# Patient Record
Sex: FEMALE | Race: NATIVE HAWAIIAN OR OTHER PACIFIC ISLANDER | Employment: FULL TIME | URBAN - METROPOLITAN AREA
[De-identification: names, ages, dates, MRNs, and addresses within clinical notes are randomized per-mention and may not be internally consistent; named-entity substitution may affect disease eponyms.]

---

## 2017-01-23 ENCOUNTER — OFFICE VISIT (OUTPATIENT)
Dept: FAMILY MEDICINE CLINIC | Age: 37
End: 2017-01-23

## 2017-01-23 VITALS
HEART RATE: 76 BPM | BODY MASS INDEX: 31.36 KG/M2 | WEIGHT: 224 LBS | DIASTOLIC BLOOD PRESSURE: 75 MMHG | RESPIRATION RATE: 20 BRPM | OXYGEN SATURATION: 99 % | HEIGHT: 71 IN | SYSTOLIC BLOOD PRESSURE: 121 MMHG | TEMPERATURE: 97.1 F

## 2017-01-23 DIAGNOSIS — G89.29 CHRONIC LEFT SHOULDER PAIN: ICD-10-CM

## 2017-01-23 DIAGNOSIS — M25.512 CHRONIC LEFT SHOULDER PAIN: ICD-10-CM

## 2017-01-23 DIAGNOSIS — R13.10 DYSPHAGIA, UNSPECIFIED TYPE: ICD-10-CM

## 2017-01-23 DIAGNOSIS — M79.89 LEG SWELLING: ICD-10-CM

## 2017-01-23 DIAGNOSIS — I10 ESSENTIAL HYPERTENSION: Primary | ICD-10-CM

## 2017-01-23 RX ORDER — CHLORTHALIDONE 25 MG/1
25 TABLET ORAL DAILY
Qty: 30 TAB | Refills: 5 | Status: SHIPPED | OUTPATIENT
Start: 2017-01-23 | End: 2017-09-25 | Stop reason: SDUPTHER

## 2017-01-23 RX ORDER — NAPROXEN 500 MG/1
500 TABLET ORAL 2 TIMES DAILY WITH MEALS
Qty: 60 TAB | Refills: 1 | Status: SHIPPED | OUTPATIENT
Start: 2017-01-23 | End: 2017-12-08

## 2017-01-23 NOTE — PROGRESS NOTES
Brooke Myles 39 y.o. female   Chief Complaint   Patient presents with    Leg Pain     x 2 months without injury    Leg Swelling         1. Have you been to the ER, urgent care clinic since your last visit? Hospitalized since your last visit? No    2. Have you seen or consulted any other health care providers outside of the [facility] since your last visit? Include any pap smears or colon screening.  No

## 2017-01-23 NOTE — PROGRESS NOTES
HISTORY OF PRESENT ILLNESS  Luis M Benavidez is a 39 y.o. female. Chief Complaint   Patient presents with    Leg Pain     x 2 months without injury    Leg Swelling       HPI  Pt c/o b lower leg pain and swelling. The swelling in the R foot is more frequent than the left. The leg pain is present upon awakening. The swelling is not usually there first thing in the morning. The swelling gets worse over the course of the day and the pain increases as well. The swelling is not more pronounced on days that she is more active. Pt is taking her hctz regularly and her bp has been nl. Pt also c/o L shoulder pain that has been present for several months. The pain is present with movement of the shoulder. She is concerned about the possibility of bone cancer due to a family member having had it with a similar presentation in the past.      Pt c/o occasional chest pain. She feels that the pill gets caught in her throat when she takes her bp med. Sometimes she has tightness in her chest.    Review of Systems   Constitutional: Negative. HENT: Negative. Respiratory: Negative. Cardiovascular: Positive for leg swelling. Negative for palpitations. Gastrointestinal:        Difficulty swallowing small pills   Musculoskeletal: Positive for joint pain. All other systems reviewed and are negative. Physical Exam   Constitutional: She is oriented to person, place, and time. She appears well-developed and well-nourished. HENT:   Head: Normocephalic and atraumatic. Right Ear: External ear normal.   Left Ear: External ear normal.   Nose: Nose normal.   Eyes: Conjunctivae and EOM are normal.   Neck: Normal range of motion. Neck supple. No JVD present. Carotid bruit is not present. No thyromegaly present. Cardiovascular: Normal rate, regular rhythm, normal heart sounds and intact distal pulses. Exam reveals no gallop and no friction rub. No murmur heard.   Pulmonary/Chest: Effort normal and breath sounds normal. She has no wheezes. She has no rhonchi. She has no rales. Musculoskeletal: Normal range of motion. Left shoulder: She exhibits tenderness. She exhibits normal range of motion and no deformity. Right lower leg: Normal. She exhibits no swelling and no edema. Left lower leg: Normal. She exhibits no swelling and no edema. Right foot: There is tenderness and swelling. Left foot: Normal. There is no swelling. Feet:    L shoulder +ttp near [facility], operated by [facility] joint; no post/inf instab; + Neer's; pain with int/ext rotation. Lymphadenopathy:     She has no cervical adenopathy. Neurological: She is alert and oriented to person, place, and time. Coordination normal.   Skin: Skin is warm and dry. Psychiatric: She has a normal mood and affect. Her behavior is normal. Judgment and thought content normal.   Nursing note and vitals reviewed. ASSESSMENT and PLAN  Roberto Pires was seen today for leg pain and leg swelling. Diagnoses and all orders for this visit:    Essential hypertension  -     chlorthalidone (HYGROTEN) 25 mg tablet; Take 1 Tab by mouth daily. D/c hctz. Chronic left shoulder pain  -     naproxen (NAPROSYN) 500 mg tablet; Take 1 Tab by mouth two (2) times daily (with meals). -     XR SHOULDER LT AP/LAT MIN 2 V; Future  Low suspicion for malignancy. Suspect AC joint pathology or muscle/tendon injury. Leg swelling  Discussed use of compression hose. Trial chlorthalidone. If no improvement, consider vascular eval.  Discussed possible role of wt in chronic leg swelling. Difficulty swallowing;  Pt advised to try eating something after taking her pill to help force it down. No other swallowing problems at this time.       Follow-up Disposition:  2-4 wks

## 2017-02-01 ENCOUNTER — HOSPITAL ENCOUNTER (OUTPATIENT)
Dept: GENERAL RADIOLOGY | Age: 37
Discharge: HOME | End: 2017-02-01
Payer: COMMERCIAL

## 2017-02-01 DIAGNOSIS — G89.29 CHRONIC LEFT SHOULDER PAIN: ICD-10-CM

## 2017-02-01 DIAGNOSIS — M25.512 CHRONIC LEFT SHOULDER PAIN: ICD-10-CM

## 2017-02-01 PROCEDURE — 73030 X-RAY EXAM OF SHOULDER: CPT

## 2017-02-06 ENCOUNTER — TELEPHONE (OUTPATIENT)
Dept: FAMILY MEDICINE CLINIC | Age: 37
End: 2017-02-06

## 2017-05-09 ENCOUNTER — TELEPHONE (OUTPATIENT)
Dept: FAMILY MEDICINE CLINIC | Age: 37
End: 2017-05-09

## 2017-09-25 DIAGNOSIS — I10 ESSENTIAL HYPERTENSION: ICD-10-CM

## 2017-09-25 RX ORDER — CHLORTHALIDONE 25 MG/1
TABLET ORAL
Qty: 30 TAB | Refills: 5 | Status: SHIPPED | OUTPATIENT
Start: 2017-09-25 | End: 2018-04-26 | Stop reason: SDUPTHER

## 2017-10-27 ENCOUNTER — OFFICE VISIT (OUTPATIENT)
Dept: FAMILY MEDICINE CLINIC | Age: 37
End: 2017-10-27

## 2017-10-27 ENCOUNTER — HOSPITAL ENCOUNTER (OUTPATIENT)
Dept: GENERAL RADIOLOGY | Age: 37
Discharge: HOME | End: 2017-10-27
Payer: COMMERCIAL

## 2017-10-27 ENCOUNTER — HOSPITAL ENCOUNTER (OUTPATIENT)
Dept: LAB | Age: 37
Discharge: HOME | End: 2017-10-27
Payer: COMMERCIAL

## 2017-10-27 VITALS
HEART RATE: 90 BPM | OXYGEN SATURATION: 97 % | HEIGHT: 71 IN | DIASTOLIC BLOOD PRESSURE: 60 MMHG | BODY MASS INDEX: 30.1 KG/M2 | WEIGHT: 215 LBS | TEMPERATURE: 98.5 F | SYSTOLIC BLOOD PRESSURE: 125 MMHG

## 2017-10-27 DIAGNOSIS — N89.8 VAGINAL DISCHARGE: ICD-10-CM

## 2017-10-27 DIAGNOSIS — N89.8 VAGINAL DISCHARGE: Primary | ICD-10-CM

## 2017-10-27 DIAGNOSIS — R07.81 PLEURITIC PAIN: ICD-10-CM

## 2017-10-27 PROCEDURE — 87798 DETECT AGENT NOS DNA AMP: CPT | Performed by: NURSE PRACTITIONER

## 2017-10-27 PROCEDURE — 71020 XR CHEST PA LAT: CPT

## 2017-10-27 NOTE — MR AVS SNAPSHOT
Visit Information Date & Time Provider Department Dept. Phone Encounter #  
 10/27/2017  1:15 PM Nick Pisano NP [address] [id number] Follow-up Instructions Return in about 1 week (around 11/3/2017), or if symptoms worsen or fail to improve, for physical exam with PCP. Upcoming Health Maintenance Date Due Pneumococcal 19-64 Medium Risk (1 of 1 - PPSV23) 3/23/1999 DTaP/Tdap/Td series (1 - Tdap) 3/23/2001 PAP AKA CERVICAL CYTOLOGY 3/31/2015 BREAST CANCER SCRN MAMMOGRAM 8/20/2015 INFLUENZA AGE 9 TO ADULT 8/1/2017 Allergies as of 10/27/2017  Review Complete On: 10/27/2017 By: Raj Espana LPN No Known Allergies Current Immunizations  Never Reviewed No immunizations on file. Not reviewed this visit You Were Diagnosed With   
  
 Codes Comments Vaginal discharge    -  Primary ICD-10-CM: N89.8 ICD-9-CM: 623.5 Pleuritic pain     ICD-10-CM: R07.81 ICD-9-CM: 786.52 Vitals BP Pulse Temp Height(growth percentile) Weight(growth percentile) SpO2  
 125/60 (BP 1 Location: Left arm, BP Patient Position: Sitting) 90 98.5 °F (36.9 °C) (Oral) 5' 11\" (1.803 m) 215 lb (97.5 kg) 97% BMI OB Status Smoking Status 29.99 kg/m2 IUD Current Every Day Smoker BMI and BSA Data Body Mass Index Body Surface Area  
 29.99 kg/m 2 2.21 m 2 Preferred Pharmacy Pharmacy Name Phone [facility] [address], [address] [facility] Your Updated Medication List  
  
   
This list is accurate as of: 10/27/17  2:18 PM.  Always use your most recent med list.  
  
  
  
  
 chlorthalidone 25 mg tablet Commonly known as:  HYGROTEN  
TAKE ONE TABLET BY MOUTH ONCE DAILY  
  
 eletriptan 40 mg tablet Commonly known as:  RELPAX Take 1 Tab by mouth once as needed for 1 dose.  may repeat in 2 hours if necessary  
  
 hydrocortisone 2.5 % rectal cream  
 Commonly known as:  PROCTOCREAM-HC Insert  into rectum four (4) times daily. multivitamin tablet Commonly known as:  ONE A DAY Take 1 Tab by mouth daily. naproxen 500 mg tablet Commonly known as:  NAPROSYN Take 1 Tab by mouth two (2) times daily (with meals). traMADol 50 mg tablet Commonly known as:  ULTRAM  
Take 1 Tab by mouth every six (6) hours as needed for Pain. Max Daily Amount: 200 mg. Follow-up Instructions Return in about 1 week (around 11/3/2017), or if symptoms worsen or fail to improve, for physical exam with PCP. Introducing [facility]! Dear Chasidy Cifuentes: Thank you for requesting a [employer] account. Our records indicate that you already have an active [employer] account. You can access your account anytime at [email or web address] Did you know that you can access your hospital and ER discharge instructions at any time in [employer]? You can also review all of your test results from your hospital stay or ER visit. Additional Information If you have questions, please visit the Frequently Asked Questions section of the [employer] website at [email or web address] Remember, B[employer] is NOT to be used for urgent needs. For medical emergencies, dial 911. Now available from your iPhone and Android! Please provide this summary of care documentation to your next provider. Your primary care clinician is listed as Ester Andrews. If you have any questions after today's visit, please call [phone].

## 2017-10-27 NOTE — PROGRESS NOTES
HPI  Juliet Simon is a 40 y.o. female  Chief Complaint   Patient presents with    Vaginal Discharge     Pt states that she noticed vaginal discharge and odor for 2 weeks. Pt states that the discharge is white and tan. Pt denies any vaginal itching. Vaginal odor and vaginal discharge for 2 weeks. Reports last menses   Reports one partner and denies that partner has any concerns. Denies pain on urination, frequency, urgency, or blood in urine. Denies fevers or chills. Denies abdominal or flank pain but admits to a pain when she takes a deep breath on right side for a week. Reports naproxen does not help. 6/10. Denies injury, trauma, or falls. Denies chest pain. Denies shortness of breath. Denies depression - reports she just does not like coming to the doctors. Past Medical History  Past Medical History:   Diagnosis Date    Chondromalacia of left patella     Epicondylitis     right elbow, lateral    Epicondylitis, lateral 11/2013    right elbow    Essential hypertension 10/24/2013    Headache, migraine     HTN (hypertension)     Knee pain     Right ankle pain     Wears glasses        Surgical History  Past Surgical History:   Procedure Laterality Date    HX BREAST REDUCTION      04/2012    HX GYN      vaginal delivery x 1    HX OTHER SURGICAL      oral surgery, unspecified         Medications  Current Outpatient Prescriptions   Medication Sig Dispense Refill    chlorthalidone (HYGROTEN) 25 mg tablet TAKE ONE TABLET BY MOUTH ONCE DAILY 30 Tab 5    eletriptan (RELPAX) 40 mg tablet Take 1 Tab by mouth once as needed for 1 dose. may repeat in 2 hours if necessary 18 Tab 5    naproxen (NAPROSYN) 500 mg tablet Take 1 Tab by mouth two (2) times daily (with meals). 60 Tab 1    hydrocortisone (PROCTOCREAM-HC) 2.5 % rectal cream Insert  into rectum four (4) times daily. 30 g 0    traMADol (ULTRAM) 50 mg tablet Take 1 Tab by mouth every six (6) hours as needed for Pain.  Max Daily Amount: 200 mg. 20 Tab 0    multivitamin (ONE A DAY) tablet Take 1 Tab by mouth daily. Allergies  No Known Allergies    Family History  Family History   Problem Relation Age of Onset    Cancer Other     Hypertension Other     Diabetes Other        Social History  Social History     Social History    Marital status: LEGALLY      Spouse name: N/A    Number of children: N/A    Years of education: N/A     Occupational History    Not on file. Social History Main Topics    Smoking status: Current Every Day Smoker     Packs/day: 0.25     Types: Cigarettes    Smokeless tobacco: Never Used    Alcohol use 2.5 oz/week     5 Standard drinks or equivalent per week      Comment: occassional    Drug use: No    Sexual activity: Yes     Partners: Male     Other Topics Concern    Not on file     Social History Narrative       Problem List  Patient Active Problem List   Diagnosis Code    Family history of hypertension Z82.49    Family history of diabetes mellitus (DM) Z83.3    Essential hypertension I10    Thrombocytopenia ([facility] [address] D69.6    IUD contraception Z97.5       Review of Systems  Review of Systems   Constitutional: Negative for chills and fever. Respiratory: Negative for shortness of breath. Cardiovascular: Negative for chest pain and palpitations. Gastrointestinal: Negative for abdominal pain, nausea and vomiting. Genitourinary: Negative for dysuria, flank pain, frequency, hematuria and urgency. Musculoskeletal: Negative for falls.        Vital Signs  Vitals:    10/27/17 1340   BP: 125/60   Pulse: 90   Temp: 98.5 °F (36.9 °C)   TempSrc: Oral   SpO2: 97%   Weight: 215 lb (97.5 kg)   Height: 5' 11\" (1.803 m)   PainSc:   0 - No pain     PHQ over the last two weeks 10/27/2017   Little interest or pleasure in doing things Not at all   Feeling down, depressed or hopeless Not at all   Total Score PHQ 2 0     Physical Exam  Physical Exam   Constitutional: She is oriented to person, place, and time. Cardiovascular: Normal rate and regular rhythm. No murmur heard. Pulmonary/Chest: Effort normal and breath sounds normal. No respiratory distress. She exhibits tenderness. Right rib area on exhaling. Genitourinary: There is no rash or tenderness on the right labia. There is no rash or tenderness on the left labia. Cervix exhibits discharge. Cervix exhibits no motion tenderness and no friability. Vaginal discharge found. Genitourinary Comments: Thin white discharge from cervix and vagina   Neurological: She is alert and oriented to person, place, and time. Skin: Skin is warm and dry. Psychiatric: She exhibits a depressed mood. Appears sad -  Behavior - tearful       Diagnostics  Orders Placed This Encounter    XR CHEST PA LAT     Standing Status:   Future     Number of Occurrences:   1     Standing Expiration Date:   11/27/2018     Order Specific Question:   Reason for Exam     Answer:   pleuritic     Order Specific Question:   Is Patient Allergic to Contrast Dye? Answer:   Unknown     Order Specific Question:   Is Patient Pregnant?      Answer:   Unknown    NUSWAB VAGINITIS PLUS     Standing Status:   Future     Number of Occurrences:   1     Standing Expiration Date:   10/28/2018       Results  Results for orders placed or performed during the hospital encounter of 10/25/15   URINALYSIS W/ RFLX MICROSCOPIC   Result Value Ref Range    Color YELLOW      Appearance CLEAR      Specific gravity 1.015 1.003 - 1.030      pH (UA) 7.0 5.0 - 8.0      Protein NEGATIVE  NEG mg/dL    Glucose NEGATIVE  NEG mg/dL    Ketone NEGATIVE  NEG mg/dL    Bilirubin NEGATIVE  NEG      Blood SMALL (A) NEG      Urobilinogen 0.2 0.2 - 1.0 EU/dL    Nitrites NEGATIVE  NEG      Leukocyte Esterase NEGATIVE  NEG     HCG URINE, QL   Result Value Ref Range    HCG urine, Ql. NEGATIVE  NEG     CBC WITH AUTOMATED DIFF   Result Value Ref Range    WBC 5.8 4.6 - 13.2 K/uL    RBC 5.27 4.20 - 5.30 M/uL    HGB 14.7 12.0 - 16.0 g/dL    HCT 45.4 (H) 35.0 - 45.0 %    MCV 86.1 74.0 - 97.0 FL    MCH 27.9 24.0 - 34.0 PG    MCHC 32.4 31.0 - 37.0 g/dL    RDW 12.9 11.6 - 14.5 %    PLATELET 248 (L) 017 - 420 K/uL    MPV 10.1 9.2 - 11.8 FL    NEUTROPHILS 48 40 - 73 %    LYMPHOCYTES 39 21 - 52 %    MONOCYTES 9 3 - 10 %    EOSINOPHILS 1 0 - 5 %    BASOPHILS 3 (H) 0 - 2 %    ABS. NEUTROPHILS 2.8 1.8 - 8.0 K/UL    ABS. LYMPHOCYTES 2.3 0.9 - 3.6 K/UL    ABS. MONOCYTES 0.5 0.05 - 1.2 K/UL    ABS. EOSINOPHILS 0.1 0.0 - 0.4 K/UL    ABS. BASOPHILS 0.2 (H) 0.0 - 0.06 K/UL    DF AUTOMATED     METABOLIC PANEL, BASIC   Result Value Ref Range    Sodium 141 136 - 145 mmol/L    Potassium 3.8 3.5 - 5.5 mmol/L    Chloride 105 100 - 108 mmol/L    CO2 30 21 - 32 mmol/L    Anion gap 6 3.0 - 18 mmol/L    Glucose 96 74 - 99 mg/dL    BUN 8 7.0 - 18 MG/DL    Creatinine 0.90 0.6 - 1.3 MG/DL    BUN/Creatinine ratio 9 (L) 12 - 20      GFR est AA >60 >60 ml/min/1.73m2    GFR est non-AA >60 >60 ml/min/1.73m2    Calcium 9.1 8.5 - 10.1 MG/DL   URINE MICROSCOPIC ONLY   Result Value Ref Range    WBC 0 to 3 0 - 4 /hpf    RBC 0 to 3 0 - 5 /hpf    Epithelial cells FEW 0 - 5 /lpf         Assessment and Plan  Diagnoses and all orders for this visit:    1. Vaginal discharge  -     NUSWAB VAGINITIS PLUS; Future    2. Pleuritic pain  -     XR CHEST PA LAT; Future        After care summary printed and reviewed with patient. Plan reviewed with patient. Questions answered. Patient verbalized understanding of plan and is in agreement with plan. Patient to follow up in one week with PCP for full follow up and physical or earlier if symptoms worsen.      ROSARIO Naqvi

## 2017-10-27 NOTE — PROGRESS NOTES
1. Have you been to the ER, urgent care clinic since your last visit? Hospitalized since your last visit? No    2. Have you seen or consulted any other health care providers outside of the [address] [place] [address] since your last visit? Include any pap smears or colon screening. No    Is someone accompanying this pt? no    Is the patient using any DME equipment during OV? no      Chief Complaint   Patient presents with    Vaginal Discharge     Pt states that she noticed vaginal discharge and odor for 2 weeks. Pt states that the discharge is white and tan. Pt denies any vaginal itching.

## 2017-10-31 ENCOUNTER — TELEPHONE (OUTPATIENT)
Dept: FAMILY MEDICINE CLINIC | Age: 37
End: 2017-10-31

## 2017-10-31 DIAGNOSIS — N76.0 BV (BACTERIAL VAGINOSIS): Primary | ICD-10-CM

## 2017-10-31 DIAGNOSIS — B96.89 BV (BACTERIAL VAGINOSIS): Primary | ICD-10-CM

## 2017-10-31 LAB
A VAGINAE DNA VAG QL NAA+PROBE: NORMAL SCORE
BVAB2 DNA VAG QL NAA+PROBE: NORMAL SCORE
C ALBICANS DNA VAG QL NAA+PROBE: NEGATIVE
C GLABRATA DNA VAG QL NAA+PROBE: NEGATIVE
C TRACH RRNA SPEC QL NAA+PROBE: NEGATIVE
MEGA1 DNA VAG QL NAA+PROBE: NORMAL SCORE
N GONORRHOEA RRNA SPEC QL NAA+PROBE: NEGATIVE
SPECIMEN SOURCE: NORMAL
T VAGINALIS RRNA SPEC QL NAA+PROBE: NEGATIVE

## 2017-10-31 RX ORDER — METRONIDAZOLE 500 MG/1
500 TABLET ORAL 2 TIMES DAILY
Qty: 14 TAB | Refills: 0 | Status: SHIPPED | OUTPATIENT
Start: 2017-10-31 | End: 2017-11-07

## 2017-10-31 NOTE — TELEPHONE ENCOUNTER
Call to patient to discuss Nu Swab and Xray results. No answer. Unable to leave a message as mailbox is full.  Natan Carrasco

## 2017-11-06 ENCOUNTER — OFFICE VISIT (OUTPATIENT)
Dept: FAMILY MEDICINE CLINIC | Age: 37
End: 2017-11-06

## 2017-11-06 ENCOUNTER — HOSPITAL ENCOUNTER (OUTPATIENT)
Dept: LAB | Age: 37
Discharge: HOME | End: 2017-11-06
Payer: COMMERCIAL

## 2017-11-06 VITALS
BODY MASS INDEX: 29.96 KG/M2 | HEIGHT: 71 IN | DIASTOLIC BLOOD PRESSURE: 66 MMHG | WEIGHT: 214 LBS | SYSTOLIC BLOOD PRESSURE: 123 MMHG | TEMPERATURE: 98.5 F | HEART RATE: 80 BPM | OXYGEN SATURATION: 100 %

## 2017-11-06 DIAGNOSIS — N76.0 BV (BACTERIAL VAGINOSIS): ICD-10-CM

## 2017-11-06 DIAGNOSIS — B96.89 BV (BACTERIAL VAGINOSIS): ICD-10-CM

## 2017-11-06 DIAGNOSIS — Z01.419 WELL WOMAN EXAM WITH ROUTINE GYNECOLOGICAL EXAM: Primary | ICD-10-CM

## 2017-11-06 DIAGNOSIS — S01.331A COMPLICATION OF RIGHT EAR PIERCING, INITIAL ENCOUNTER: ICD-10-CM

## 2017-11-06 DIAGNOSIS — R05.9 COUGH: ICD-10-CM

## 2017-11-06 DIAGNOSIS — K64.4 EXTERNAL HEMORRHOID: ICD-10-CM

## 2017-11-06 DIAGNOSIS — Z71.2 ENCOUNTER TO DISCUSS TEST RESULTS: ICD-10-CM

## 2017-11-06 DIAGNOSIS — Z01.419 WELL WOMAN EXAM WITH ROUTINE GYNECOLOGICAL EXAM: ICD-10-CM

## 2017-11-06 LAB
ALBUMIN SERPL-MCNC: 4.1 G/DL (ref 3.4–5)
ALBUMIN/GLOB SERPL: 1.2 {RATIO} (ref 0.8–1.7)
ALP SERPL-CCNC: 50 U/L (ref 45–117)
ALT SERPL-CCNC: 20 U/L (ref 13–56)
ANION GAP SERPL CALC-SCNC: 10 MMOL/L (ref 3–18)
AST SERPL-CCNC: 13 U/L (ref 15–37)
BASOPHILS # BLD: 0 K/UL (ref 0–0.06)
BASOPHILS NFR BLD: 0 % (ref 0–2)
BILIRUB SERPL-MCNC: 0.9 MG/DL (ref 0.2–1)
BUN SERPL-MCNC: 10 MG/DL (ref 7–18)
BUN/CREAT SERPL: 12 (ref 12–20)
CALCIUM SERPL-MCNC: 9.4 MG/DL (ref 8.5–10.1)
CHLORIDE SERPL-SCNC: 101 MMOL/L (ref 100–108)
CHOLEST SERPL-MCNC: 168 MG/DL
CO2 SERPL-SCNC: 29 MMOL/L (ref 21–32)
CREAT SERPL-MCNC: 0.83 MG/DL (ref 0.6–1.3)
DIFFERENTIAL METHOD BLD: ABNORMAL
EOSINOPHIL # BLD: 0.1 K/UL (ref 0–0.4)
EOSINOPHIL NFR BLD: 2 % (ref 0–5)
ERYTHROCYTE [DISTWIDTH] IN BLOOD BY AUTOMATED COUNT: 13 % (ref 11.6–14.5)
EST. AVERAGE GLUCOSE BLD GHB EST-MCNC: 105 MG/DL
GLOBULIN SER CALC-MCNC: 3.4 G/DL (ref 2–4)
GLUCOSE SERPL-MCNC: 85 MG/DL (ref 74–99)
HBA1C MFR BLD: 5.3 % (ref 4.2–5.6)
HCT VFR BLD AUTO: 46 % (ref 35–45)
HDLC SERPL-MCNC: 53 MG/DL (ref 40–60)
HDLC SERPL: 3.2 {RATIO} (ref 0–5)
HGB BLD-MCNC: 15.1 G/DL (ref 12–16)
LDLC SERPL CALC-MCNC: 101.4 MG/DL (ref 0–100)
LIPID PROFILE,FLP: ABNORMAL
LYMPHOCYTES # BLD: 1.5 K/UL (ref 0.9–3.6)
LYMPHOCYTES NFR BLD: 23 % (ref 21–52)
MCH RBC QN AUTO: 29.5 PG (ref 24–34)
MCHC RBC AUTO-ENTMCNC: 32.8 G/DL (ref 31–37)
MCV RBC AUTO: 90 FL (ref 74–97)
MONOCYTES # BLD: 0.5 K/UL (ref 0.05–1.2)
MONOCYTES NFR BLD: 8 % (ref 3–10)
NEUTS SEG # BLD: 4.4 K/UL (ref 1.8–8)
NEUTS SEG NFR BLD: 67 % (ref 40–73)
PLATELET # BLD AUTO: 163 K/UL (ref 135–420)
PMV BLD AUTO: 10.2 FL (ref 9.2–11.8)
POTASSIUM SERPL-SCNC: 4 MMOL/L (ref 3.5–5.5)
PROT SERPL-MCNC: 7.5 G/DL (ref 6.4–8.2)
RBC # BLD AUTO: 5.11 M/UL (ref 4.2–5.3)
SODIUM SERPL-SCNC: 140 MMOL/L (ref 136–145)
T4 FREE SERPL-MCNC: 1 NG/DL (ref 0.7–1.5)
TRIGL SERPL-MCNC: 68 MG/DL (ref ?–150)
TSH SERPL DL<=0.05 MIU/L-ACNC: 0.88 UIU/ML (ref 0.36–3.74)
VLDLC SERPL CALC-MCNC: 13.6 MG/DL
WBC # BLD AUTO: 6.5 K/UL (ref 4.6–13.2)

## 2017-11-06 PROCEDURE — 80053 COMPREHEN METABOLIC PANEL: CPT | Performed by: NURSE PRACTITIONER

## 2017-11-06 PROCEDURE — 83036 HEMOGLOBIN GLYCOSYLATED A1C: CPT | Performed by: NURSE PRACTITIONER

## 2017-11-06 PROCEDURE — 36415 COLL VENOUS BLD VENIPUNCTURE: CPT | Performed by: NURSE PRACTITIONER

## 2017-11-06 PROCEDURE — 84443 ASSAY THYROID STIM HORMONE: CPT | Performed by: NURSE PRACTITIONER

## 2017-11-06 PROCEDURE — 80061 LIPID PANEL: CPT | Performed by: NURSE PRACTITIONER

## 2017-11-06 PROCEDURE — 84439 ASSAY OF FREE THYROXINE: CPT | Performed by: NURSE PRACTITIONER

## 2017-11-06 PROCEDURE — 82306 VITAMIN D 25 HYDROXY: CPT | Performed by: NURSE PRACTITIONER

## 2017-11-06 PROCEDURE — 85025 COMPLETE CBC W/AUTO DIFF WBC: CPT | Performed by: NURSE PRACTITIONER

## 2017-11-06 RX ORDER — BENZONATATE 200 MG/1
200 CAPSULE ORAL
Qty: 30 CAP | Refills: 0 | Status: SHIPPED | OUTPATIENT
Start: 2017-11-06 | End: 2017-11-13

## 2017-11-06 NOTE — PROGRESS NOTES
Chief Complaint   Patient presents with    Well Woman     with pap     Health Maintenance Due   Topic Date Due    Pneumococcal 19-64 Medium Risk (1 of 1 - PPSV23) 03/23/1999    DTaP/Tdap/Td series (1 - Tdap) 03/23/2001    PAP AKA CERVICAL CYTOLOGY  03/31/2015    BREAST CANCER SCRN MAMMOGRAM  08/20/2015    INFLUENZA AGE 9 TO ADULT  08/01/2017

## 2017-11-06 NOTE — MR AVS SNAPSHOT
Visit Information Date & Time Provider Department Dept. Phone Encounter #  
 11/6/2017  9:00 AM Yudelka Dubose NP [address] [id number] Follow-up Instructions Return if symptoms worsen or fail to improve, for follow up with PCP. Upcoming Health Maintenance Date Due Pneumococcal 19-64 Medium Risk (1 of 1 - PPSV23) 3/23/1999 DTaP/Tdap/Td series (1 - Tdap) 3/23/2001 PAP AKA CERVICAL CYTOLOGY 3/31/2015 BREAST CANCER SCRN MAMMOGRAM 8/20/2015 Allergies as of 11/6/2017  Review Complete On: 11/6/2017 By: Kay Del Rio LPN No Known Allergies Current Immunizations  Never Reviewed No immunizations on file. Not reviewed this visit You Were Diagnosed With   
  
 Codes Comments Well woman exam with routine gynecological exam    -  Primary ICD-10-CM: T12.186 ICD-9-CM: V72.31 Encounter to discuss test results     ICD-10-CM: Z71.89 ICD-9-CM: V65.49   
 BV (bacterial vaginosis)     ICD-10-CM: N76.0, B96.89 
ICD-9-CM: 616.10, 041.9 Cough     ICD-10-CM: R05 ICD-9-CM: 248. 2 Complication of right ear piercing, initial encounter     ICD-10-CM: B50.454P ICD-9-CM: 872.10 BMI 29.0-29.9,adult     ICD-10-CM: U69.47 ICD-9-CM: V85.25 External hemorrhoid     ICD-10-CM: K64.4 ICD-9-CM: 170. 3 Vitals BP Pulse Temp Height(growth percentile) Weight(growth percentile) SpO2  
 123/66 80 98.5 °F (36.9 °C) (Oral) 5' 11\" (1.803 m) 214 lb (97.1 kg) 100% BMI OB Status Smoking Status 29.85 kg/m2 IUD Current Every Day Smoker BMI and BSA Data Body Mass Index Body Surface Area  
 29.85 kg/m 2 2.21 m 2 Preferred Pharmacy Pharmacy Name Phone [facility] [address], [address] Your Updated Medication List  
  
   
This list is accurate as of: 11/6/17 10:14 AM.  Always use your most recent med list.  
  
  
  
  
 benzonatate 200 mg capsule Commonly known as:  TESSALON Take 1 Cap by mouth three (3) times daily as needed for Cough for up to 7 days. chlorthalidone 25 mg tablet Commonly known as:  HYGROTEN  
TAKE ONE TABLET BY MOUTH ONCE DAILY  
  
 eletriptan 40 mg tablet Commonly known as:  RELPAX Take 1 Tab by mouth once as needed for 1 dose. may repeat in 2 hours if necessary  
  
 metroNIDAZOLE 500 mg tablet Commonly known as:  FLAGYL Take 1 Tab by mouth two (2) times a day for 7 days. naproxen 500 mg tablet Commonly known as:  NAPROSYN Take 1 Tab by mouth two (2) times daily (with meals). Prescriptions Sent to Pharmacy Refills  
 benzonatate (TESSALON) 200 mg capsule 0 Sig: Take 1 Cap by mouth three (3) times daily as needed for Cough for up to 7 days. Class: Normal  
 Pharmacy: [facility] [address] Ph #: [phone] Route: Oral  
  
We Performed the Following REFERRAL TO COLON AND RECTAL SURGERY [REF17 Custom] Follow-up Instructions Return if symptoms worsen or fail to improve, for follow up with PCP. To-Do List   
 05/07/2018 Imaging:  [facility] BI SCREENING INCL CAD Referral Information Referral ID Referred By Referred To  
  
 [id number] Padmini Nix, [address] [facility], [address] [place] [address] Str. Phone: [phone] Fax: [phone] Visits Status Start Date End Date 1 New Request 11/6/17 11/6/18 If your referral has a status of pending review or denied, additional information will be sent to support the outcome of this decision. Patient Instructions Please contact our office if you have any questions about your visit today. Bacterial Vaginosis: Care Instructions Your Care Instructions Bacterial vaginosis is a type of vaginal infection.  It is caused by excess growth of certain bacteria that are normally found in the vagina. Symptoms can include itching, swelling, pain when you urinate or have sex, and a gray or yellow discharge with a \"fishy\" odor. It is not considered an infection that is spread through sexual contact. Although symptoms can be annoying and uncomfortable, bacterial vaginosis does not usually cause other health problems. However, if you have it while you are pregnant, it can cause complications. While the infection may go away on its own, most doctors use antibiotics to treat it. You may have been prescribed pills or vaginal cream. With treatment, bacterial vaginosis usually clears up in 5 to 7 days. Follow-up care is a key part of your treatment and safety. Be sure to make and go to all appointments, and call your doctor if you are having problems. It's also a good idea to know your test results and keep a list of the medicines you take. How can you care for yourself at home? · Take your antibiotics as directed. Do not stop taking them just because you feel better. You need to take the full course of antibiotics. · Do not eat or drink anything that contains alcohol if you are taking metronidazole (Flagyl). · Keep using your medicine if you start your period. Use pads instead of tampons while using a vaginal cream or suppository. Tampons can absorb the medicine. · Wear loose cotton clothing. Do not wear nylon and other materials that hold body heat and moisture close to the skin. · Do not scratch. Relieve itching with a cold pack or a cool bath. · Do not wash your vaginal area more than once a day. Use plain water or a mild, unscented soap. Do not douche. When should you call for help? Watch closely for changes in your health, and be sure to contact your doctor if: 
? · You have unexpected vaginal bleeding. ? · You have a fever. ? · You have new or increased pain in your vagina or pelvis. ? · You are not getting better after 1 week. ? · Your symptoms return after you finish the course of your medicine. Where can you learn more? Go to http://juliano-yara.info/. Moreno Rajput in the search box to learn more about \"Bacterial Vaginosis: Care Instructions. \" Current as of: October 13, 2016 Content Version: 11.4 © 0632-5023 [employer]. Care instructions adapted under license by [employer] (which disclaims liability or warranty for this information). If you have questions about a medical condition or this instruction, always ask your healthcare professional. [place] [address] any warranty or liability for your use of this information. Cough: Care Instructions Your Care Instructions A cough is your body's response to something that bothers your throat or airways. Many things can cause a cough. You might cough because of a cold or the flu, bronchitis, or asthma. Smoking, postnasal drip, allergies, and stomach acid that backs up into your throat also can cause coughs. A cough is a symptom, not a disease. Most coughs stop when the cause, such as a cold, goes away. You can take a few steps at home to cough less and feel better. Follow-up care is a key part of your treatment and safety. Be sure to make and go to all appointments, and call your doctor if you are having problems. It's also a good idea to know your test results and keep a list of the medicines you take. How can you care for yourself at home? · Drink lots of water and other fluids. This helps thin the mucus and soothes a dry or sore throat. Honey or lemon juice in hot water or tea may ease a dry cough. · Take cough medicine as directed by your doctor. · Prop up your head on pillows to help you breathe and ease a dry cough. · Try cough drops to soothe a dry or sore throat. Cough drops don't stop a cough. Medicine-flavored cough drops are no better than candy-flavored drops or hard candy. · Do not smoke. Avoid secondhand smoke. If you need help quitting, talk to your doctor about stop-smoking programs and medicines. These can increase your chances of quitting for good. When should you call for help? Call 911 anytime you think you may need emergency care. For example, call if: 
? · You have severe trouble breathing. ?Call your doctor now or seek immediate medical care if: 
? · You cough up blood. ? · You have new or worse trouble breathing. ? · You have a new or higher fever. ? · You have a new rash. ? Watch closely for changes in your health, and be sure to contact your doctor if: 
? · You cough more deeply or more often, especially if you notice more mucus or a change in the color of your mucus. ? · You have new symptoms, such as a sore throat, an earache, or sinus pain. ? · You do not get better as expected. Where can you learn more? Go to http://juliano-yara.info/. Enter D279 in the search box to learn more about \"Cough: Care Instructions. \" Current as of: May 12, 2017 Content Version: 11.4 © 4686-6168 [employer]ote[employer]. Care instructions adapted under license by [employer] (which disclaims liability or warranty for this information). If you have questions about a medical condition or this instruction, always ask your healthcare professional. [address] any warranty or liability for your use of this information. DASH Diet: Care Instructions Your Care Instructions The DASH diet is an eating plan that can help lower your blood pressure. DASH stands for Dietary Approaches to Stop Hypertension. Hypertension is high blood pressure. The DASH diet focuses on eating foods that are high in calcium, potassium, and magnesium. These nutrients can lower blood pressure.  The foods that are highest in these nutrients are fruits, vegetables, low-fat dairy products, nuts, seeds, and legumes. But taking calcium, potassium, and magnesium supplements instead of eating foods that are high in those nutrients does not have the same effect. The DASH diet also includes whole grains, fish, and poultry. The DASH diet is one of several lifestyle changes your doctor may recommend to lower your high blood pressure. Your doctor may also want you to decrease the amount of sodium in your diet. Lowering sodium while following the DASH diet can lower blood pressure even further than just the DASH diet alone. Follow-up care is a key part of your treatment and safety. Be sure to make and go to all appointments, and call your doctor if you are having problems. It's also a good idea to know your test results and keep a list of the medicines you take. How can you care for yourself at home? Following the DASH diet · Eat 4 to 5 servings of fruit each day. A serving is 1 medium-sized piece of fruit, ½ cup chopped or canned fruit, 1/4 cup dried fruit, or 4 ounces (½ cup) of fruit juice. Choose fruit more often than fruit juice. · Eat 4 to 5 servings of vegetables each day. A serving is 1 cup of lettuce or raw leafy vegetables, ½ cup of chopped or cooked vegetables, or 4 ounces (½ cup) of vegetable juice. Choose vegetables more often than vegetable juice. · Get 2 to 3 servings of low-fat and fat-free dairy each day. A serving is 8 ounces of milk, 1 cup of yogurt, or 1 ½ ounces of cheese. · Eat 6 to 8 servings of grains each day. A serving is 1 slice of bread, 1 ounce of dry cereal, or ½ cup of cooked rice, pasta, or cooked cereal. Try to choose whole-grain products as much as possible. · Limit lean meat, poultry, and fish to 2 servings each day. A serving is 3 ounces, about the size of a deck of cards. · Eat 4 to 5 servings of nuts, seeds, and legumes (cooked dried beans, lentils, and split peas) each week.  A serving is 1/3 cup of nuts, 2 tablespoons of seeds, or ½ cup of cooked beans or peas. · Limit fats and oils to 2 to 3 servings each day. A serving is 1 teaspoon of vegetable oil or 2 tablespoons of salad dressing. · Limit sweets and added sugars to 5 servings or less a week. A serving is 1 tablespoon jelly or jam, ½ cup sorbet, or 1 cup of lemonade. · Eat less than 2,300 milligrams (mg) of sodium a day. If you limit your sodium to 1,500 mg a day, you can lower your blood pressure even more. Tips for success · Start small. Do not try to make dramatic changes to your diet all at once. You might feel that you are missing out on your favorite foods and then be more likely to not follow the plan. Make small changes, and stick with them. Once those changes become habit, add a few more changes. · Try some of the following: ¨ Make it a goal to eat a fruit or vegetable at every meal and at snacks. This will make it easy to get the recommended amount of fruits and vegetables each day. ¨ Try yogurt topped with fruit and nuts for a snack or healthy dessert. ¨ Add lettuce, tomato, cucumber, and onion to sandwiches. ¨ Combine a ready-made pizza crust with low-fat mozzarella cheese and lots of vegetable toppings. Try using tomatoes, squash, spinach, broccoli, carrots, cauliflower, and onions. ¨ Have a variety of cut-up vegetables with a low-fat dip as an appetizer instead of chips and dip. ¨ Sprinkle sunflower seeds or chopped almonds over salads. Or try adding chopped walnuts or almonds to cooked vegetables. ¨ Try some vegetarian meals using beans and peas. Add garbanzo or kidney beans to salads. Make burritos and tacos with mashed foster beans or black beans. Where can you learn more? Go to http://juliano-yara.info/. Enter E755 in the search box to learn more about \"DASH Diet: Care Instructions. \" Current as of: September 21, 2016 Content Version: 11.4 © 9242-9507 [employer].  Care instructions adapted under license by [facility] [address] (which disclaims liability or warranty for this information). If you have questions about a medical condition or this instruction, always ask your healthcare professional. [address] any warranty or liability for your use of this information. Earwax Blockage: Care Instructions Your Care Instructions Earwax is a natural substance that protects the ear canal. Normally, earwax drains from the ears and does not cause problems. Sometimes earwax builds up and hardens. Earwax blockage (also called cerumen impaction) can cause some loss of hearing and pain. When wax is tightly packed, you will need to have your doctor remove it. Follow-up care is a key part of your treatment and safety. Be sure to make and go to all appointments, and call your doctor if you are having problems. It's also a good idea to know your test results and keep a list of the medicines you take. How can you care for yourself at home? · Do not try to remove earwax with cotton swabs, fingers, or other objects. This can make the blockage worse and damage the eardrum. · If your doctor recommends that you try to remove earwax at home: ¨ Soften and loosen the earwax with warm mineral oil. You also can try hydrogen peroxide mixed with an equal amount of room temperature water. Place 2 drops of the fluid, warmed to body temperature, in the ear two times a day for up to 5 days. ¨ Once the wax is loose and soft, all that is usually needed to remove it from the ear canal is a gentle, warm shower. Direct the water into the ear, then tip your head to let the earwax drain out. Dry your ear thoroughly with a hair dryer set on low. Hold the dryer several inches from your ear. ¨ If the warm mineral oil and shower do not work, use an over-the-counter wax softener followed by gentle flushing with an ear syringe each night for a week or two. Make sure the flushing solution is body temperature. Cool or hot fluids in the ear can cause dizziness. When should you call for help? Call your doctor now or seek immediate medical care if: 
? · Pus or blood drains from your ear. ? · Your ears are ringing or feel full. ? · You have a loss of hearing. ? Watch closely for changes in your health, and be sure to contact your doctor if: 
? · You have pain or reduced hearing after 1 week of home treatment. ? · You have any new symptoms, such as nausea or balance problems. Where can you learn more? Go to http://juliano-yara.info/. Enter N600 in the search box to learn more about \"Earwax Blockage: Care Instructions. \" Current as of: March 20, 2017 Content Version: 11.4 © 5100-2668 [employer]. Care instructions adapted under license by [employer] (which disclaims liability or warranty for this information). If you have questions about a medical condition or this instruction, always ask your healthcare professional. [place] [address] any warranty or liability for your use of this information. Introducing [facility]! Dear Pawan Owens: Thank you for requesting a [employer] account. Our records indicate that you already have an active D[employer] account. You can access your account anytime at [email or web address] Did you know that you can access your hospital and ER discharge instructions at any time in DvineWave? You can also review all of your test results from your hospital stay or ER visit. Additional Information If you have questions, please visit the Frequently Asked Questions section of the Dvine[employer] website at [email or web address] Remember, DvineWave is NOT to be used for urgent needs. For medical emergencies, dial 911. Now available from your iPhone and Android! Please provide this summary of care documentation to your next provider. Your primary care clinician is listed as Ashely Olivares. If you have any questions after today's visit, please call [phone].

## 2017-11-06 NOTE — PROGRESS NOTES
JUN Hardin is a 40 y.o. female    Chief Complaint   Patient presents with    Well Woman     with pap   Reports annual eye exam and wearing contacts. Reports recent visit to the dentist in the last 6 months. Reports ear piercing for just over a week. Reports she has not been cleaning her piercing as she was suppose to. Reports it became irritated three days ago and she started using hot water and epsom salt. Reports she is cleaning it more and paying more attention to it now with the solution that she was given to prevent infection. Unsure of the name of the solution she is using. Reports area is improving since she started using the ear solution with decreased swelling and tenderness. Cough for one week. Denies fevers or chills. Reports having IUD and states she has not had a menses. Reports no change in vaginal odor or discharge. States she has not started the flagyl. Admits to mild depression that occurs with life events. Reports this is a chronic issue on and off. Denies being on medication. Denies depression currently. Denies desire to hurt or harm self or others. Denies suicidal ideations. Admits to drinking alcohol and smoking cigarettes. Denies desire to stop. Requesting a referral for her hemorrhoids. Admits to not using cream previously ordered as patient states it does not help.   Past Medical History  Past Medical History:   Diagnosis Date    Chondromalacia of left patella     Epicondylitis     right elbow, lateral    Epicondylitis, lateral 11/2013    right elbow    Essential hypertension 10/24/2013    Headache, migraine     HTN (hypertension)     Knee pain     Right ankle pain     Wears glasses        Surgical History  Past Surgical History:   Procedure Laterality Date    HX BREAST REDUCTION      04/2012    HX GYN      vaginal delivery x 1    HX OTHER SURGICAL      oral surgery, unspecified         Medications  Current Outpatient Prescriptions   Medication Sig Dispense Refill    benzonatate (TESSALON) 200 mg capsule Take 1 Cap by mouth three (3) times daily as needed for Cough for up to 7 days. 30 Cap 0    chlorthalidone (HYGROTEN) 25 mg tablet TAKE ONE TABLET BY MOUTH ONCE DAILY 30 Tab 5    naproxen (NAPROSYN) 500 mg tablet Take 1 Tab by mouth two (2) times daily (with meals). 60 Tab 1    eletriptan (RELPAX) 40 mg tablet Take 1 Tab by mouth once as needed for 1 dose. may repeat in 2 hours if necessary 18 Tab 5    metroNIDAZOLE (FLAGYL) 500 mg tablet Take 1 Tab by mouth two (2) times a day for 7 days. 14 Tab 0       Allergies  No Known Allergies    Family History  Family History   Problem Relation Age of Onset    Cancer Other     Hypertension Other     Diabetes Other        Social History  Social History     Social History    Marital status: LEGALLY      Spouse name: N/A    Number of children: N/A    Years of education: N/A     Occupational History    Not on file. Social History Main Topics    Smoking status: Current Every Day Smoker     Packs/day: 0.25     Types: Cigarettes    Smokeless tobacco: Never Used    Alcohol use 2.5 oz/week     5 Standard drinks or equivalent per week      Comment: occassional    Drug use: No    Sexual activity: Yes     Partners: Male     Other Topics Concern    Not on file     Social History Narrative       Problem List  Patient Active Problem List   Diagnosis Code    Family history of hypertension Z82.49    Family history of diabetes mellitus (DM) Z83.3    Essential hypertension I10    Thrombocytopenia ([facility] [address] D69.6    IUD contraception Z97.5       Review of Systems  Review of Systems   Constitutional: Negative for chills and fever. HENT: Positive for congestion. Negative for ear pain, sinus pain and sore throat. Eyes: Negative for blurred vision, double vision and pain. Respiratory: Positive for cough, sputum production (yellow) and shortness of breath (mild when coughing).  Negative for hemoptysis and wheezing. Cardiovascular: Positive for leg swelling (off and on - chronic). Negative for chest pain and palpitations. Gastrointestinal: Negative for abdominal pain, blood in stool, constipation, diarrhea, nausea and vomiting. Genitourinary: Negative for dysuria, flank pain, frequency, hematuria and urgency. Musculoskeletal: Negative for back pain, falls, joint pain and neck pain. Skin: Negative for rash. Neurological: Negative for dizziness and speech change. Psychiatric/Behavioral: Positive for depression (chronic). Negative for suicidal ideas. Vital Signs  Vitals:    11/06/17 0856   BP: 123/66   Pulse: 80   Temp: 98.5 °F (36.9 °C)   TempSrc: Oral   SpO2: 100%   Weight: 214 lb (97.1 kg)   Height: 5' 11\" (1.803 m)   PainSc:   0 - No pain     PHQ over the last two weeks 10/27/2017   Little interest or pleasure in doing things Not at all   Feeling down, depressed or hopeless Not at all   Total Score PHQ 2 0       Physical Exam  Physical Exam   Constitutional: She is oriented to person, place, and time. HENT:   Right Ear: External ear normal.   Left Ear: External ear normal.   Mouth/Throat: Oropharynx is clear and moist.   Moderate amount of dark cerumen in left ear. Able to visualize left TM. Left TM normal.   Eyes: Pupils are equal, round, and reactive to light. Neck: Normal range of motion. Cardiovascular: Normal rate, regular rhythm, normal heart sounds and intact distal pulses. Exam reveals no friction rub. No murmur heard. Pulmonary/Chest: Effort normal and breath sounds normal. No respiratory distress. She has no wheezes. She has no rales. Abdominal: Soft. Bowel sounds are normal.   Genitourinary: Rectal exam shows external hemorrhoid. There is no rash, tenderness or lesion on the right labia. There is no rash, tenderness or lesion on the left labia. Cervix exhibits discharge. Cervix exhibits no motion tenderness. Right adnexum displays no mass. Left adnexum displays no mass. Vaginal discharge found. Genitourinary Comments: Yellow discharge from cervix and vagina. Musculoskeletal: Normal range of motion. She exhibits no edema or deformity. Neurological: She is alert and oriented to person, place, and time. Skin: Skin is warm and dry. Left ear piercing with swelling, tenderness, and redness. Psychiatric: She has a normal mood and affect. Her behavior is normal.       Diagnostics  Orders Placed This Encounter    ANDRES MAMMO BI SCREENING INCL CAD     Standing Status:   Future     Standing Expiration Date:   12/7/2018     Order Specific Question:   Reason for Exam     Answer:   annual screening due to a history of a concerning mass     Order Specific Question:   Is Patient Pregnant? Answer:   Unknown    CBC WITH AUTOMATED DIFF     Standing Status:   Future     Standing Expiration Date:   17/0/3741    METABOLIC PANEL, COMPREHENSIVE     Standing Status:   Future     Standing Expiration Date:   5/6/2018    VITAMIN D, 25 HYDROXY     Standing Status:   Future     Standing Expiration Date:   11/6/2018    TSH 3RD GENERATION     Standing Status:   Future     Standing Expiration Date:   5/6/2018    T4, FREE     Standing Status:   Future     Standing Expiration Date:   11/7/2018    HEMOGLOBIN A1C WITH EAG     Standing Status:   Future     Standing Expiration Date:   11/7/2018    LIPID PANEL     Standing Status:   Future     Standing Expiration Date:   5/6/2018   [facility] ref [facility]     Referral Priority:   Routine     Referral Type:   Consultation     Referral Reason:   Specialty Services Required     Referred to Provider:   Joshua Ivy MD    benzonatate (TESSALON) 200 mg capsule     Sig: Take 1 Cap by mouth three (3) times daily as needed for Cough for up to 7 days. Dispense:  30 Cap     Refill:  0    PAP, IG, RFX HPV ASCUS ([id number])     Standing Status:   Future     Standing Expiration Date:   5/6/2018     Order Specific Question:   Pap Source? Answer:   Cervical and Endocervical     Order Specific Question:   Total Hysterectomy? Answer:   No     Order Specific Question:   Supracervical Hysterectomy? Answer:   No     Order Specific Question:   Post Menopausal?     Answer:   No     Order Specific Question:   Hormone Therapy? Answer:   No     Order Specific Question:   IUD? Answer:   No     Order Specific Question:   Abnormal Bleeding? Answer:   No     Order Specific Question:   Pregnant     Answer:   No     Order Specific Question:   Post Partum? Answer:   No       Results  Results for orders placed or performed during the hospital encounter of 10/27/17   NUSWAB VAGINITIS PLUS   Result Value Ref Range    Source VAGINA      Atopobium vaginae SEE COMMENT Score    BVAB 2 SEE COMMENT Score    Megasphaera 1 SEE COMMENT Score    C. albicans, KRISSY NEGATIVE  NEGATIVE      C. glabrata, KRISSY NEGATIVE  NEGATIVE      T. vaginalis, KRISSY NEGATIVE  NEGATIVE      C. trachomatis, KRISSY NEGATIVE  NEGATIVE      N. gonorrhoeae, KRISSY NEGATIVE  NEGATIVE           Assessment and Plan  Diagnoses and all orders for this visit:    1. Well woman exam with routine gynecological exam  -     PAP, IG, RFX HPV ASCUS ([id number]); Future  -     ANDRES MAMMO BI SCREENING INCL CAD; Future  -     CBC WITH AUTOMATED DIFF; Future  -     METABOLIC PANEL, COMPREHENSIVE; Future  -     VITAMIN D, 25 HYDROXY; Future  -     TSH 3RD GENERATION; Future  -     T4, FREE; Future  -     HEMOGLOBIN A1C WITH EAG; Future  -     LIPID PANEL; Future    2. Encounter to discuss test results    3. BV (bacterial vaginosis)    4. Cough  -     benzonatate (TESSALON) 200 mg capsule; Take 1 Cap by mouth three (3) times daily as needed for Cough for up to 7 days. 5. Complication of right ear piercing, initial encounter    6. BMI 29.0-29.9,adult    7. External hemorrhoid  -     [facility] ref [facility]    BMI- agrees to walk at least three times weekly for 45 mins.  Discussed foods that she can eat such as lean proteins, fresh fruits and vegatables. Instructed to start flagyl. Discussed side effects and warnings of tessalon and flagyl. Discussed importance of cleaning ear piercing to reduce infection. Instructed to return if symptoms increased. After care summary printed and reviewed with patient. Plan reviewed with patient. Questions answered. Patient verbalized understanding of plan and is in agreement with plan. Patient to follow up as needed with her PCP or earlier if symptoms worsen.     Masha Sharp, PERCY-C

## 2017-11-06 NOTE — PATIENT INSTRUCTIONS
Please contact our office if you have any questions about your visit today. Bacterial Vaginosis: Care Instructions  Your Care Instructions    Bacterial vaginosis is a type of vaginal infection. It is caused by excess growth of certain bacteria that are normally found in the vagina. Symptoms can include itching, swelling, pain when you urinate or have sex, and a gray or yellow discharge with a \"fishy\" odor. It is not considered an infection that is spread through sexual contact. Although symptoms can be annoying and uncomfortable, bacterial vaginosis does not usually cause other health problems. However, if you have it while you are pregnant, it can cause complications. While the infection may go away on its own, most doctors use antibiotics to treat it. You may have been prescribed pills or vaginal cream. With treatment, bacterial vaginosis usually clears up in 5 to 7 days. Follow-up care is a key part of your treatment and safety. Be sure to make and go to all appointments, and call your doctor if you are having problems. It's also a good idea to know your test results and keep a list of the medicines you take. How can you care for yourself at home? · Take your antibiotics as directed. Do not stop taking them just because you feel better. You need to take the full course of antibiotics. · Do not eat or drink anything that contains alcohol if you are taking metronidazole (Flagyl). · Keep using your medicine if you start your period. Use pads instead of tampons while using a vaginal cream or suppository. Tampons can absorb the medicine. · Wear loose cotton clothing. Do not wear nylon and other materials that hold body heat and moisture close to the skin. · Do not scratch. Relieve itching with a cold pack or a cool bath. · Do not wash your vaginal area more than once a day. Use plain water or a mild, unscented soap. Do not douche. When should you call for help?   Watch closely for changes in your health, and be sure to contact your doctor if:  ? · You have unexpected vaginal bleeding. ? · You have a fever. ? · You have new or increased pain in your vagina or pelvis. ? · You are not getting better after 1 week. ? · Your symptoms return after you finish the course of your medicine. Where can you learn more? Go to http://juliano-yara.info/. Terrell Gordillo in the search box to learn more about \"Bacterial Vaginosis: Care Instructions. \"  Current as of: October 13, 2016  Content Version: 11.4  © 4346-9926 [employer]. Care instructions adapted under license by [employer] (which disclaims liability or warranty for this information). If you have questions about a medical condition or this instruction, always ask your healthcare professional. [address] any warranty or liability for your use of this information. Cough: Care Instructions  Your Care Instructions    A cough is your body's response to something that bothers your throat or airways. Many things can cause a cough. You might cough because of a cold or the flu, bronchitis, or asthma. Smoking, postnasal drip, allergies, and stomach acid that backs up into your throat also can cause coughs. A cough is a symptom, not a disease. Most coughs stop when the cause, such as a cold, goes away. You can take a few steps at home to cough less and feel better. Follow-up care is a key part of your treatment and safety. Be sure to make and go to all appointments, and call your doctor if you are having problems. It's also a good idea to know your test results and keep a list of the medicines you take. How can you care for yourself at home? · Drink lots of water and other fluids. This helps thin the mucus and soothes a dry or sore throat. Honey or lemon juice in hot water or tea may ease a dry cough. · Take cough medicine as directed by your doctor.   · Prop up your head on pillows to help you breathe and ease a dry cough. · Try cough drops to soothe a dry or sore throat. Cough drops don't stop a cough. Medicine-flavored cough drops are no better than candy-flavored drops or hard candy. · Do not smoke. Avoid secondhand smoke. If you need help quitting, talk to your doctor about stop-smoking programs and medicines. These can increase your chances of quitting for good. When should you call for help? Call 911 anytime you think you may need emergency care. For example, call if:  ? · You have severe trouble breathing. ?Call your doctor now or seek immediate medical care if:  ? · You cough up blood. ? · You have new or worse trouble breathing. ? · You have a new or higher fever. ? · You have a new rash. ? Watch closely for changes in your health, and be sure to contact your doctor if:  ? · You cough more deeply or more often, especially if you notice more mucus or a change in the color of your mucus. ? · You have new symptoms, such as a sore throat, an earache, or sinus pain. ? · You do not get better as expected. Where can you learn more? Go to http://juliano-yara.info/. Enter D279 in the search box to learn more about \"Cough: Care Instructions. \"  Current as of: May 12, 2017  Content Version: 11.4  © 1610-3396 [employer]. Care instructions adapted under license by [employer] (which disclaims liability or warranty for this information). If you have questions about a medical condition or this instruction, always ask your healthcare professional. [place] [address] any warranty or liability for your use of this information. DASH Diet: Care Instructions  Your Care Instructions    The DASH diet is an eating plan that can help lower your blood pressure. DASH stands for Dietary Approaches to Stop Hypertension. Hypertension is high blood pressure.   The DASH diet focuses on eating foods that are high in calcium, potassium, and magnesium. These nutrients can lower blood pressure. The foods that are highest in these nutrients are fruits, vegetables, low-fat dairy products, nuts, seeds, and legumes. But taking calcium, potassium, and magnesium supplements instead of eating foods that are high in those nutrients does not have the same effect. The DASH diet also includes whole grains, fish, and poultry. The DASH diet is one of several lifestyle changes your doctor may recommend to lower your high blood pressure. Your doctor may also want you to decrease the amount of sodium in your diet. Lowering sodium while following the DASH diet can lower blood pressure even further than just the DASH diet alone. Follow-up care is a key part of your treatment and safety. Be sure to make and go to all appointments, and call your doctor if you are having problems. It's also a good idea to know your test results and keep a list of the medicines you take. How can you care for yourself at home? Following the DASH diet  · Eat 4 to 5 servings of fruit each day. A serving is 1 medium-sized piece of fruit, ½ cup chopped or canned fruit, 1/4 cup dried fruit, or 4 ounces (½ cup) of fruit juice. Choose fruit more often than fruit juice. · Eat 4 to 5 servings of vegetables each day. A serving is 1 cup of lettuce or raw leafy vegetables, ½ cup of chopped or cooked vegetables, or 4 ounces (½ cup) of vegetable juice. Choose vegetables more often than vegetable juice. · Get 2 to 3 servings of low-fat and fat-free dairy each day. A serving is 8 ounces of milk, 1 cup of yogurt, or 1 ½ ounces of cheese. · Eat 6 to 8 servings of grains each day. A serving is 1 slice of bread, 1 ounce of dry cereal, or ½ cup of cooked rice, pasta, or cooked cereal. Try to choose whole-grain products as much as possible. · Limit lean meat, poultry, and fish to 2 servings each day. A serving is 3 ounces, about the size of a deck of cards.   · Eat 4 to 5 servings of nuts, seeds, and legumes (cooked dried beans, lentils, and split peas) each week. A serving is 1/3 cup of nuts, 2 tablespoons of seeds, or ½ cup of cooked beans or peas. · Limit fats and oils to 2 to 3 servings each day. A serving is 1 teaspoon of vegetable oil or 2 tablespoons of salad dressing. · Limit sweets and added sugars to 5 servings or less a week. A serving is 1 tablespoon jelly or jam, ½ cup sorbet, or 1 cup of lemonade. · Eat less than 2,300 milligrams (mg) of sodium a day. If you limit your sodium to 1,500 mg a day, you can lower your blood pressure even more. Tips for success  · Start small. Do not try to make dramatic changes to your diet all at once. You might feel that you are missing out on your favorite foods and then be more likely to not follow the plan. Make small changes, and stick with them. Once those changes become habit, add a few more changes. · Try some of the following:  ¨ Make it a goal to eat a fruit or vegetable at every meal and at snacks. This will make it easy to get the recommended amount of fruits and vegetables each day. ¨ Try yogurt topped with fruit and nuts for a snack or healthy dessert. ¨ Add lettuce, tomato, cucumber, and onion to sandwiches. ¨ Combine a ready-made pizza crust with low-fat mozzarella cheese and lots of vegetable toppings. Try using tomatoes, squash, spinach, broccoli, carrots, cauliflower, and onions. ¨ Have a variety of cut-up vegetables with a low-fat dip as an appetizer instead of chips and dip. ¨ Sprinkle sunflower seeds or chopped almonds over salads. Or try adding chopped walnuts or almonds to cooked vegetables. ¨ Try some vegetarian meals using beans and peas. Add garbanzo or kidney beans to salads. Make burritos and tacos with mashed foster beans or black beans. Where can you learn more? Go to http://juliano-yara.info/. Enter A587 in the search box to learn more about \"DASH Diet: Care Instructions. \"  Current as of: September 21, 2016  Content Version: 11.4  © 7476-5825 [employer]. Care instructions adapted under license by [employer] (which disclaims liability or warranty for this information). If you have questions about a medical condition or this instruction, always ask your healthcare professional. [address] any warranty or liability for your use of this information. Earwax Blockage: Care Instructions  Your Care Instructions    Earwax is a natural substance that protects the ear canal. Normally, earwax drains from the ears and does not cause problems. Sometimes earwax builds up and hardens. Earwax blockage (also called cerumen impaction) can cause some loss of hearing and pain. When wax is tightly packed, you will need to have your doctor remove it. Follow-up care is a key part of your treatment and safety. Be sure to make and go to all appointments, and call your doctor if you are having problems. It's also a good idea to know your test results and keep a list of the medicines you take. How can you care for yourself at home? · Do not try to remove earwax with cotton swabs, fingers, or other objects. This can make the blockage worse and damage the eardrum. · If your doctor recommends that you try to remove earwax at home:  ¨ Soften and loosen the earwax with warm mineral oil. You also can try hydrogen peroxide mixed with an equal amount of room temperature water. Place 2 drops of the fluid, warmed to body temperature, in the ear two times a day for up to 5 days. ¨ Once the wax is loose and soft, all that is usually needed to remove it from the ear canal is a gentle, warm shower. Direct the water into the ear, then tip your head to let the earwax drain out. Dry your ear thoroughly with a hair dryer set on low. Hold the dryer several inches from your ear.   ¨ If the warm mineral oil and shower do not work, use an over-the-counter wax softener followed by gentle flushing with an ear syringe each night for a week or two. Make sure the flushing solution is body temperature. Cool or hot fluids in the ear can cause dizziness. When should you call for help? Call your doctor now or seek immediate medical care if:  ? · Pus or blood drains from your ear. ? · Your ears are ringing or feel full. ? · You have a loss of hearing. ? Watch closely for changes in your health, and be sure to contact your doctor if:  ? · You have pain or reduced hearing after 1 week of home treatment. ? · You have any new symptoms, such as nausea or balance problems. Where can you learn more? Go to http://juliano-yara.info/. Enter M652 in the search box to learn more about \"Earwax Blockage: Care Instructions. \"  Current as of: March 20, 2017  Content Version: 11.4  © 1834-9543 [employer]. Care instructions adapted under license by [employer] (which disclaims liability or warranty for this information). If you have questions about a medical condition or this instruction, always ask your healthcare professional. [address] any warranty or liability for your use of this information.

## 2017-11-07 LAB — 25(OH)D3 SERPL-MCNC: 17.2 NG/ML (ref 30–100)

## 2017-11-09 DIAGNOSIS — E55.9 VITAMIN D DEFICIENCY: Primary | ICD-10-CM

## 2017-11-09 RX ORDER — ERGOCALCIFEROL 1.25 MG/1
50000 CAPSULE ORAL
Qty: 12 CAP | Refills: 3 | Status: SHIPPED | OUTPATIENT
Start: 2017-11-09 | End: 2018-04-26 | Stop reason: SDUPTHER

## 2017-11-09 NOTE — PROGRESS NOTES
Pap is negative for intraepithelial lesion or malignancy. Continue annual well woman exams.  Jonas, KALINAP-C

## 2017-11-09 NOTE — PROGRESS NOTES
Sent to patient in [address] - Vitamin D is 17.2 which is deficient. Will send in a high dose Vitamin D at 50,000 units and you should take this weekly for 12 weeks. This is a prescription and will be sent to your pharmacy. This lab will need to be drawn again in 3 months. Lipids -LDL is slightly elevated at 101.4 we like this number below 100. Lipid profile tells you about your good and bad cholesterol - You should initiate lifestyle changes such as exercise at least 3 times a week at 45 min intervals along with a diet low in saturated fats. Increase soluble fiber in diet such as fruit and vegetables will help lower LDL. All other lipid values were in normal range. HCT is slightly elevated at 46.0 otherwise CBC is within normal limits. Please make sure you are staying hydrated. All other labs are negative, normal, or near normal and are of no clinical significance. Please follow up with your PCP in three months.  Natan Carrasco

## 2017-11-27 ENCOUNTER — HOSPITAL ENCOUNTER (OUTPATIENT)
Dept: LAB | Age: 37
Discharge: HOME | End: 2017-11-27
Payer: COMMERCIAL

## 2017-11-27 ENCOUNTER — HOSPITAL ENCOUNTER (OUTPATIENT)
Dept: MAMMOGRAPHY | Age: 37
Discharge: HOME | End: 2017-11-27
Attending: NURSE PRACTITIONER
Payer: COMMERCIAL

## 2017-11-27 ENCOUNTER — OFFICE VISIT (OUTPATIENT)
Dept: SURGERY | Age: 37
End: 2017-11-27

## 2017-11-27 VITALS
HEART RATE: 91 BPM | DIASTOLIC BLOOD PRESSURE: 70 MMHG | HEIGHT: 71 IN | BODY MASS INDEX: 30.52 KG/M2 | SYSTOLIC BLOOD PRESSURE: 106 MMHG | WEIGHT: 218 LBS | RESPIRATION RATE: 18 BRPM | TEMPERATURE: 98.4 F

## 2017-11-27 DIAGNOSIS — K64.2 GRADE III HEMORRHOIDS: Primary | ICD-10-CM

## 2017-11-27 DIAGNOSIS — K62.5 RECTAL BLEEDING: ICD-10-CM

## 2017-11-27 DIAGNOSIS — Z01.419 WELL WOMAN EXAM WITH ROUTINE GYNECOLOGICAL EXAM: ICD-10-CM

## 2017-11-27 DIAGNOSIS — K64.2 GRADE III HEMORRHOIDS: ICD-10-CM

## 2017-11-27 LAB
ANION GAP SERPL CALC-SCNC: 3 MMOL/L (ref 3–18)
ATRIAL RATE: 87 BPM
BASOPHILS # BLD: 0 K/UL (ref 0–0.06)
BASOPHILS NFR BLD: 0 % (ref 0–2)
BUN SERPL-MCNC: 12 MG/DL (ref 7–18)
BUN/CREAT SERPL: 15 (ref 12–20)
CALCIUM SERPL-MCNC: 8.9 MG/DL (ref 8.5–10.1)
CALCULATED P AXIS, ECG09: 55 DEGREES
CALCULATED R AXIS, ECG10: -26 DEGREES
CALCULATED T AXIS, ECG11: 29 DEGREES
CHLORIDE SERPL-SCNC: 99 MMOL/L (ref 100–108)
CO2 SERPL-SCNC: 35 MMOL/L (ref 21–32)
CREAT SERPL-MCNC: 0.81 MG/DL (ref 0.6–1.3)
DIAGNOSIS, 93000: NORMAL
DIFFERENTIAL METHOD BLD: ABNORMAL
EOSINOPHIL # BLD: 0.1 K/UL (ref 0–0.4)
EOSINOPHIL NFR BLD: 1 % (ref 0–5)
ERYTHROCYTE [DISTWIDTH] IN BLOOD BY AUTOMATED COUNT: 12.8 % (ref 11.6–14.5)
GLUCOSE SERPL-MCNC: 104 MG/DL (ref 74–99)
HCT VFR BLD AUTO: 45.4 % (ref 35–45)
HGB BLD-MCNC: 15 G/DL (ref 12–16)
LYMPHOCYTES # BLD: 2.2 K/UL (ref 0.9–3.6)
LYMPHOCYTES NFR BLD: 37 % (ref 21–52)
MCH RBC QN AUTO: 29.3 PG (ref 24–34)
MCHC RBC AUTO-ENTMCNC: 33 G/DL (ref 31–37)
MCV RBC AUTO: 88.7 FL (ref 74–97)
MONOCYTES # BLD: 0.3 K/UL (ref 0.05–1.2)
MONOCYTES NFR BLD: 6 % (ref 3–10)
NEUTS SEG # BLD: 3.3 K/UL (ref 1.8–8)
NEUTS SEG NFR BLD: 56 % (ref 40–73)
P-R INTERVAL, ECG05: 144 MS
PLATELET # BLD AUTO: 151 K/UL (ref 135–420)
PMV BLD AUTO: 10.1 FL (ref 9.2–11.8)
POTASSIUM SERPL-SCNC: 3.6 MMOL/L (ref 3.5–5.5)
Q-T INTERVAL, ECG07: 382 MS
QRS DURATION, ECG06: 82 MS
QTC CALCULATION (BEZET), ECG08: 459 MS
RBC # BLD AUTO: 5.12 M/UL (ref 4.2–5.3)
SODIUM SERPL-SCNC: 137 MMOL/L (ref 136–145)
VENTRICULAR RATE, ECG03: 87 BPM
WBC # BLD AUTO: 5.9 K/UL (ref 4.6–13.2)

## 2017-11-27 PROCEDURE — 93005 ELECTROCARDIOGRAM TRACING: CPT

## 2017-11-27 PROCEDURE — 85025 COMPLETE CBC W/AUTO DIFF WBC: CPT | Performed by: COLON & RECTAL SURGERY

## 2017-11-27 PROCEDURE — 80048 BASIC METABOLIC PNL TOTAL CA: CPT | Performed by: COLON & RECTAL SURGERY

## 2017-11-27 PROCEDURE — 36415 COLL VENOUS BLD VENIPUNCTURE: CPT | Performed by: COLON & RECTAL SURGERY

## 2017-11-27 PROCEDURE — 77063 BREAST TOMOSYNTHESIS BI: CPT

## 2017-11-27 NOTE — MR AVS SNAPSHOT
Visit Information Date & Time Provider Department Dept. Phone Encounter #  
 11/27/2017  8:45 AM Lou Ortega  [address] [id number] Follow-up Instructions Return for Surgery. Follow-up and Disposition History Upcoming Health Maintenance Date Due Pneumococcal 19-64 Medium Risk (1 of 1 - PPSV23) 3/23/1999 DTaP/Tdap/Td series (1 - Tdap) 3/23/2001 BREAST CANCER SCRN MAMMOGRAM 8/20/2015 PAP AKA CERVICAL CYTOLOGY 11/6/2018 Allergies as of 11/27/2017  Review Complete On: 11/27/2017 By: Lou Ortega MD  
 No Known Allergies Current Immunizations  Never Reviewed No immunizations on file. Not reviewed this visit You Were Diagnosed With   
  
 Codes Comments Grade III hemorrhoids    -  Primary ICD-10-CM: W45.7 ICD-9-CM: 455.0 Rectal bleeding     ICD-10-CM: K62.5 ICD-9-CM: 569.3 Vitals BP Pulse Temp Resp Height(growth percentile) Weight(growth percentile) 106/70 91 98.4 °F (36.9 °C) 18 5' 11\" (1.803 m) 218 lb (98.9 kg) BMI OB Status Smoking Status 30.4 kg/m2 IUD Current Every Day Smoker BMI and BSA Data Body Mass Index Body Surface Area  
 30.4 kg/m 2 2.23 m 2 Preferred Pharmacy Pharmacy Name Phone [facility] [address] [id number] [facility] Your Updated Medication List  
  
   
This list is accurate as of: 11/27/17 10:28 AM.  Always use your most recent med list.  
  
  
  
  
 chlorthalidone 25 mg tablet Commonly known as:  HYGROTEN  
TAKE ONE TABLET BY MOUTH ONCE DAILY  
  
 eletriptan 40 mg tablet Commonly known as:  RELPAX Take 1 Tab by mouth once as needed for 1 dose. may repeat in 2 hours if necessary  
  
 ergocalciferol 50,000 unit capsule Commonly known as:  ERGOCALCIFEROL Take 1 Cap by mouth every seven (7) days. naproxen 500 mg tablet Commonly known as:  NAPROSYN  
 Take 1 Tab by mouth two (2) times daily (with meals). We Performed the Following [place] DIAGNOSTIC ANOSCOPY P607912 CPT(R)] SCHEDULE SURGERY [GUO9142 Custom] Follow-up Instructions Return for Surgery. To-Do List   
 11/27/2017 Lab:  CBC WITH AUTOMATED DIFF   
  
 11/27/2017 ECG:  EKG, 12 LEAD, INITIAL   
  
 11/27/2017 Lab:  METABOLIC PANEL, BASIC   
  
 11/27/2017 4:45 PM  
  Appointment with [facility] 1 at [address] [place] [address] ([phone]) OUTSIDE FILMS  - Any outside films related to the study being scheduled should be brought with you on the day of the exam.  If this cannot be done there may be a delay in the reading of the study. MEDICATIONS  - Patient must bring a complete list of all medications currently taking to include prescriptions, over-the-counter meds, herbals, vitamins & any dietary supplements  GENERAL INSTRUCTIONS  - On the day of your exam do not use any bath powder, deodorant or lotions on the armpit area. -Tenderness of breasts may cause an increase of discomfort during procedure. If you are experiencing breast tenderness on the day of your appointment and would like to reschedule, please call [phone]. Introducing [facility]! Dear Donovan Macias: Thank you for requesting a OptuLink account. Our records indicate that you already have an active OptuLink account. You can access your account anytime at [email or web address] Did you know that you can access your hospital and ER discharge instructions at any time in OptuLink? You can also review all of your test results from your hospital stay or ER visit. Additional Information If you have questions, please visit the Frequently Asked Questions section of the OptuLink website at [email or web address] Remember, OptuLink is NOT to be used for urgent needs. For medical emergencies, dial 911. Now available from your iPhone and Android! Please provide this summary of care documentation to your next provider. Your primary care clinician is listed as Marino Navarro. If you have any questions after today's visit, please call [phone].

## 2017-11-27 NOTE — PROGRESS NOTES
HPI: Samira Tavarez is a 40 y.o. female presenting with chief complain of hemorrhoids. She has swelling and bright red blood per rectum. She has had symptoms for the last 2 years. She requires manual reduction of the hemorrhoids. She is maintain a high-fiber diet and this is not resolve the issue. She sees blood on toilet paper, in the toilet bowl and sometimes mixed with stool. She has tried preparation H wipes without effect. She has some occasional nausea and abdominal pain. She has lost 15 pounds in the last 2 months. She has 1 bowel movement per day. She has occasional constipation and diarrhea. She denies fecal incontinence. She had one episode of inability to discern flatus from stool. She has never had a colonoscopy.     Past Medical History:   Diagnosis Date    Chondromalacia of left patella     Epicondylitis     right elbow, lateral    Epicondylitis, lateral 11/2013    right elbow    Essential hypertension 10/24/2013    Headache, migraine     HTN (hypertension)     Knee pain     Right ankle pain     Wears glasses        Past Surgical History:   Procedure Laterality Date    HX BREAST REDUCTION      04/2012    HX GYN      vaginal delivery x 1    HX OTHER SURGICAL      oral surgery, unspecified        Family History   Problem Relation Age of Onset    Cancer Other     Hypertension Other     Diabetes Other        Social History     Social History    Marital status: SINGLE     Spouse name: N/A    Number of children: N/A    Years of education: N/A     Social History Main Topics    Smoking status: Current Every Day Smoker     Packs/day: 0.25     Types: Cigarettes    Smokeless tobacco: Never Used    Alcohol use 2.5 oz/week     5 Standard drinks or equivalent per week      Comment: occassional    Drug use: No    Sexual activity: Yes     Partners: Male     Other Topics Concern    None     Social History Narrative       Review of Systems - Review of Systems Constitutional: Positive for malaise/fatigue. Negative for chills, diaphoresis, fever and weight loss. HENT: Negative. Eyes: Negative. Respiratory: Negative. Cardiovascular: Positive for leg swelling. Negative for chest pain, palpitations, orthopnea, claudication and PND. Gastrointestinal: Positive for abdominal pain, blood in stool and constipation. Negative for heartburn, melena, nausea and vomiting. Genitourinary: Negative. Musculoskeletal: Negative. Skin: Negative. Neurological: Positive for tingling. Negative for dizziness, tremors, sensory change, speech change, focal weakness, seizures, loss of consciousness, weakness and headaches. Endo/Heme/Allergies: Negative. Psychiatric/Behavioral: Negative. Outpatient Prescriptions Marked as Taking for the 11/27/17 encounter (Office Visit) with Latosha Robbins MD   Medication Sig Dispense Refill    ergocalciferol (ERGOCALCIFEROL) 50,000 unit capsule Take 1 Cap by mouth every seven (7) days. 12 Cap 3    chlorthalidone (HYGROTEN) 25 mg tablet TAKE ONE TABLET BY MOUTH ONCE DAILY 30 Tab 5    naproxen (NAPROSYN) 500 mg tablet Take 1 Tab by mouth two (2) times daily (with meals). 60 Tab 1    eletriptan (RELPAX) 40 mg tablet Take 1 Tab by mouth once as needed for 1 dose. may repeat in 2 hours if necessary 18 Tab 5       No Known Allergies    Vitals:    11/27/17 0936   BP: 106/70   Pulse: 91   Resp: 18   Temp: 98.4 °F (36.9 °C)   Weight: 98.9 kg (218 lb)   Height: 5' 11\" (1.803 m)   PainSc:   0 - No pain       Physical Exam   Constitutional: She appears well-developed and well-nourished. HENT:   Head: Normocephalic and atraumatic. Eyes: Conjunctivae and EOM are normal.   Abdominal: Soft. She exhibits no distension. There is no tenderness. Musculoskeletal: Normal range of motion. Lymphadenopathy:     She has no cervical adenopathy. Right: No inguinal adenopathy present. Left: No inguinal adenopathy present. Neurological: She exhibits normal muscle tone. Skin: Skin is warm and dry. No rash noted. Psychiatric: She has a normal mood and affect. Her speech is normal.   Rectum: Severe circumferential external hemorrhoids with a small amount of protruding internal hemorrhoid  Digital rectal exam: Slightly increased tone, no mass  Anoscopy: Minimal internal hemorrhoidal disease    Assessment / Plan    Grade 3 hemorrhoids  Schedule hemorrhoidectomy    Rectal bleeding  will need colonoscopy after recovery from hemorrhoidectomy    The diagnoses and plan were discussed with the patient. All questions answered. Plan of care agreed to by all concerned.

## 2017-11-27 NOTE — LETTER
11/27/2017 10:23 AM 
 
Patient:  Mingo Mitchell YOB: 1980 Date of Visit: 11/27/2017 Theyolandae Sender, SHEILA 
[address] [address] [address] [address] VIA In Basket Dear Nanette Rubinstein, I saw one Margareth Covert in the office today regarding her hemorrhoids. She has had fairly significant symptoms for the last 2 years, including bleeding and prolapse. On exam she has chronically prolapsed circumferential hemorrhoidal disease. I have given her options of both conservative management with fiber supplementation and surgery, and she would prefer the latter. We will proceed shortly to the operating room for hemorrhoidectomy. When she is fully recovered from this we will complete her evaluation with a colonoscopy to be sure there are no other causes of the rectal bleeding. Thank you very much for your referral of Ms. Yajaira Valera. If you have questions, please do not hesitate to call me. I look forward to following Ms. Whit Jurado along with you and will keep you updated as to her progress. Sincerely, Wood Hull MD

## 2017-11-28 NOTE — PROGRESS NOTES
Please contact patient and inform her that mammogram showed no evidence of malignancy and she should have annual mammograms.

## 2017-12-07 ENCOUNTER — ANESTHESIA EVENT (OUTPATIENT)
Dept: SURGERY | Age: 37
End: 2017-12-07
Payer: COMMERCIAL

## 2017-12-08 ENCOUNTER — ANESTHESIA (OUTPATIENT)
Dept: SURGERY | Age: 37
End: 2017-12-08
Payer: COMMERCIAL

## 2017-12-08 ENCOUNTER — DOCUMENTATION ONLY (OUTPATIENT)
Dept: SURGERY | Age: 37
End: 2017-12-08

## 2017-12-08 ENCOUNTER — HOSPITAL ENCOUNTER (OUTPATIENT)
Age: 37
Setting detail: OUTPATIENT SURGERY
Discharge: HOME | End: 2017-12-08
Attending: COLON & RECTAL SURGERY | Admitting: COLON & RECTAL SURGERY
Payer: COMMERCIAL

## 2017-12-08 VITALS
BODY MASS INDEX: 30.15 KG/M2 | DIASTOLIC BLOOD PRESSURE: 64 MMHG | RESPIRATION RATE: 16 BRPM | HEIGHT: 71 IN | SYSTOLIC BLOOD PRESSURE: 107 MMHG | OXYGEN SATURATION: 96 % | HEART RATE: 69 BPM | TEMPERATURE: 97.3 F | WEIGHT: 215.4 LBS

## 2017-12-08 LAB — HCG UR QL: NEGATIVE

## 2017-12-08 PROCEDURE — 77030013079 HC BLNKT BAIR HGGR 3M -A: Performed by: ANESTHESIOLOGY

## 2017-12-08 PROCEDURE — 77030011266 HC ELECTRD BLD INSL COVD -A: Performed by: COLON & RECTAL SURGERY

## 2017-12-08 PROCEDURE — 74011000250 HC RX REV CODE- 250: Performed by: NURSE ANESTHETIST, CERTIFIED REGISTERED

## 2017-12-08 PROCEDURE — 74011250636 HC RX REV CODE- 250/636

## 2017-12-08 PROCEDURE — 77030031139 HC SUT VCRL2 J&J -A: Performed by: COLON & RECTAL SURGERY

## 2017-12-08 PROCEDURE — 76060000032 HC ANESTHESIA 0.5 TO 1 HR: Performed by: COLON & RECTAL SURGERY

## 2017-12-08 PROCEDURE — 76210000016 HC OR PH I REC 1 TO 1.5 HR: Performed by: COLON & RECTAL SURGERY

## 2017-12-08 PROCEDURE — 88304 TISSUE EXAM BY PATHOLOGIST: CPT | Performed by: COLON & RECTAL SURGERY

## 2017-12-08 PROCEDURE — 77030018836 HC SOL IRR NACL ICUM -A: Performed by: COLON & RECTAL SURGERY

## 2017-12-08 PROCEDURE — 81025 URINE PREGNANCY TEST: CPT

## 2017-12-08 PROCEDURE — 74011250636 HC RX REV CODE- 250/636: Performed by: NURSE ANESTHETIST, CERTIFIED REGISTERED

## 2017-12-08 PROCEDURE — 74011000250 HC RX REV CODE- 250: Performed by: COLON & RECTAL SURGERY

## 2017-12-08 PROCEDURE — 77030032490 HC SLV COMPR SCD KNE COVD -B: Performed by: COLON & RECTAL SURGERY

## 2017-12-08 PROCEDURE — 74011250636 HC RX REV CODE- 250/636: Performed by: COLON & RECTAL SURGERY

## 2017-12-08 PROCEDURE — 76010000138 HC OR TIME 0.5 TO 1 HR: Performed by: COLON & RECTAL SURGERY

## 2017-12-08 PROCEDURE — 76210000021 HC REC RM PH II 0.5 TO 1 HR: Performed by: COLON & RECTAL SURGERY

## 2017-12-08 PROCEDURE — C9290 INJ, BUPIVACAINE LIPOSOME: HCPCS | Performed by: COLON & RECTAL SURGERY

## 2017-12-08 PROCEDURE — 77030011640 HC PAD GRND REM COVD -A: Performed by: COLON & RECTAL SURGERY

## 2017-12-08 RX ORDER — MAGNESIUM SULFATE 100 %
4 CRYSTALS MISCELLANEOUS AS NEEDED
Status: DISCONTINUED | OUTPATIENT
Start: 2017-12-08 | End: 2017-12-08 | Stop reason: HOSPADM

## 2017-12-08 RX ORDER — SODIUM CHLORIDE, SODIUM LACTATE, POTASSIUM CHLORIDE, CALCIUM CHLORIDE 600; 310; 30; 20 MG/100ML; MG/100ML; MG/100ML; MG/100ML
75 INJECTION, SOLUTION INTRAVENOUS CONTINUOUS
Status: DISCONTINUED | OUTPATIENT
Start: 2017-12-08 | End: 2017-12-08 | Stop reason: HOSPADM

## 2017-12-08 RX ORDER — SODIUM CHLORIDE 0.9 % (FLUSH) 0.9 %
5-10 SYRINGE (ML) INJECTION AS NEEDED
Status: DISCONTINUED | OUTPATIENT
Start: 2017-12-08 | End: 2017-12-08 | Stop reason: HOSPADM

## 2017-12-08 RX ORDER — DEXTROSE MONOHYDRATE 25 G/50ML
25-50 INJECTION, SOLUTION INTRAVENOUS AS NEEDED
Status: DISCONTINUED | OUTPATIENT
Start: 2017-12-08 | End: 2017-12-08 | Stop reason: HOSPADM

## 2017-12-08 RX ORDER — INSULIN LISPRO 100 [IU]/ML
INJECTION, SOLUTION INTRAVENOUS; SUBCUTANEOUS ONCE
Status: DISCONTINUED | OUTPATIENT
Start: 2017-12-08 | End: 2017-12-08 | Stop reason: HOSPADM

## 2017-12-08 RX ORDER — HYDROMORPHONE HYDROCHLORIDE 2 MG/ML
0.5 INJECTION, SOLUTION INTRAMUSCULAR; INTRAVENOUS; SUBCUTANEOUS
Status: DISCONTINUED | OUTPATIENT
Start: 2017-12-08 | End: 2017-12-08 | Stop reason: HOSPADM

## 2017-12-08 RX ORDER — OXYCODONE AND ACETAMINOPHEN 5; 325 MG/1; MG/1
1 TABLET ORAL
Qty: 40 TAB | Refills: 0 | Status: SHIPPED | OUTPATIENT
Start: 2017-12-08

## 2017-12-08 RX ORDER — SODIUM CHLORIDE 0.9 % (FLUSH) 0.9 %
5-10 SYRINGE (ML) INJECTION EVERY 8 HOURS
Status: DISCONTINUED | OUTPATIENT
Start: 2017-12-08 | End: 2017-12-08 | Stop reason: HOSPADM

## 2017-12-08 RX ORDER — FENTANYL CITRATE 50 UG/ML
INJECTION, SOLUTION INTRAMUSCULAR; INTRAVENOUS AS NEEDED
Status: DISCONTINUED | OUTPATIENT
Start: 2017-12-08 | End: 2017-12-08 | Stop reason: HOSPADM

## 2017-12-08 RX ORDER — HYDROMORPHONE HYDROCHLORIDE 2 MG/ML
INJECTION, SOLUTION INTRAMUSCULAR; INTRAVENOUS; SUBCUTANEOUS
Status: COMPLETED
Start: 2017-12-08 | End: 2017-12-08

## 2017-12-08 RX ORDER — PROPOFOL 10 MG/ML
INJECTION, EMULSION INTRAVENOUS
Status: DISCONTINUED | OUTPATIENT
Start: 2017-12-08 | End: 2017-12-08 | Stop reason: HOSPADM

## 2017-12-08 RX ORDER — SODIUM CHLORIDE, SODIUM LACTATE, POTASSIUM CHLORIDE, CALCIUM CHLORIDE 600; 310; 30; 20 MG/100ML; MG/100ML; MG/100ML; MG/100ML
INJECTION, SOLUTION INTRAVENOUS
Status: DISCONTINUED | OUTPATIENT
Start: 2017-12-08 | End: 2017-12-08 | Stop reason: HOSPADM

## 2017-12-08 RX ORDER — FENTANYL CITRATE 50 UG/ML
50 INJECTION, SOLUTION INTRAMUSCULAR; INTRAVENOUS AS NEEDED
Status: DISCONTINUED | OUTPATIENT
Start: 2017-12-08 | End: 2017-12-08 | Stop reason: HOSPADM

## 2017-12-08 RX ORDER — DEXTROSE 50 % IN WATER (D50W) INTRAVENOUS SYRINGE
25-50 AS NEEDED
Status: DISCONTINUED | OUTPATIENT
Start: 2017-12-08 | End: 2017-12-08 | Stop reason: HOSPADM

## 2017-12-08 RX ORDER — MIDAZOLAM HYDROCHLORIDE 1 MG/ML
INJECTION, SOLUTION INTRAMUSCULAR; INTRAVENOUS AS NEEDED
Status: DISCONTINUED | OUTPATIENT
Start: 2017-12-08 | End: 2017-12-08 | Stop reason: HOSPADM

## 2017-12-08 RX ORDER — SODIUM CHLORIDE, SODIUM LACTATE, POTASSIUM CHLORIDE, CALCIUM CHLORIDE 600; 310; 30; 20 MG/100ML; MG/100ML; MG/100ML; MG/100ML
100 INJECTION, SOLUTION INTRAVENOUS CONTINUOUS
Status: DISCONTINUED | OUTPATIENT
Start: 2017-12-08 | End: 2017-12-08 | Stop reason: HOSPADM

## 2017-12-08 RX ORDER — NALBUPHINE HYDROCHLORIDE 10 MG/ML
5 INJECTION, SOLUTION INTRAMUSCULAR; INTRAVENOUS; SUBCUTANEOUS
Status: DISCONTINUED | OUTPATIENT
Start: 2017-12-08 | End: 2017-12-08 | Stop reason: HOSPADM

## 2017-12-08 RX ADMIN — SODIUM CHLORIDE, SODIUM LACTATE, POTASSIUM CHLORIDE, CALCIUM CHLORIDE: 600; 310; 30; 20 INJECTION, SOLUTION INTRAVENOUS at 10:15

## 2017-12-08 RX ADMIN — HYDROMORPHONE HYDROCHLORIDE 0.5 MG: 2 INJECTION, SOLUTION INTRAMUSCULAR; INTRAVENOUS; SUBCUTANEOUS at 12:16

## 2017-12-08 RX ADMIN — MIDAZOLAM HYDROCHLORIDE 2 MG: 1 INJECTION, SOLUTION INTRAMUSCULAR; INTRAVENOUS at 10:51

## 2017-12-08 RX ADMIN — PROPOFOL 75 MCG/KG/MIN: 10 INJECTION, EMULSION INTRAVENOUS at 10:51

## 2017-12-08 RX ADMIN — SODIUM CHLORIDE, SODIUM LACTATE, POTASSIUM CHLORIDE, CALCIUM CHLORIDE: 600; 310; 30; 20 INJECTION, SOLUTION INTRAVENOUS at 11:30

## 2017-12-08 RX ADMIN — FENTANYL CITRATE 100 MCG: 50 INJECTION, SOLUTION INTRAMUSCULAR; INTRAVENOUS at 11:00

## 2017-12-08 RX ADMIN — SODIUM CHLORIDE, SODIUM LACTATE, POTASSIUM CHLORIDE, AND CALCIUM CHLORIDE 75 ML/HR: 600; 310; 30; 20 INJECTION, SOLUTION INTRAVENOUS at 09:24

## 2017-12-08 RX ADMIN — HYDROMORPHONE HYDROCHLORIDE 0.5 MG: 2 INJECTION, SOLUTION INTRAMUSCULAR; INTRAVENOUS; SUBCUTANEOUS at 12:26

## 2017-12-08 RX ADMIN — FENTANYL CITRATE 100 MCG: 50 INJECTION, SOLUTION INTRAMUSCULAR; INTRAVENOUS at 10:51

## 2017-12-08 RX ADMIN — FAMOTIDINE 20 MG: 10 INJECTION, SOLUTION INTRAVENOUS at 09:24

## 2017-12-08 NOTE — ANESTHESIA POSTPROCEDURE EVALUATION
Post-Anesthesia Evaluation and Assessment    Patient: Dafne Jordan MRN: [id number]  SSN: [id number]    YOB: 1980  Age: 40 y.o. Sex: female       Cardiovascular Function/Vital Signs  Visit Vitals    /64 (BP 1 Location: Right arm, BP Patient Position: At rest)    Pulse 69    Temp 36.3 °C (97.3 °F)    Resp 16    Ht 5' 11\" (1.803 m)    Wt 97.7 kg (215 lb 6.4 oz)    SpO2 96%    BMI 30.04 kg/m2       Patient is status post MAC anesthesia for Procedure(s): HEMORRHOIDECTOMY/PRONE. Nausea/Vomiting: None    Postoperative hydration reviewed and adequate. Pain:  Pain Scale 1: Visual (12/08/17 1303)  Pain Intensity 1: 0 (12/08/17 1303)   Managed    Neurological Status:   Neuro (WDL): Within Defined Limits (12/08/17 1157)   At baseline    Mental Status and Level of Consciousness: Arousable    Pulmonary Status:   O2 Device: Room air (12/08/17 1303)   Adequate oxygenation and airway patent    Complications related to anesthesia: None    Post-anesthesia assessment completed.  No concerns    Signed By: Ayala Skaggs MD     December 8, 2017

## 2017-12-08 NOTE — PERIOP NOTES
Patient c/o strange sensation to both legs. Patient reports \"They feel heavy. \"  Strong pedal pulses present and patient able to move both legs spontaneously and on command. Dr. Alonzo Fails informed and states the sensation the patient is experiencing may be due to the Exparel medication that was applied to the the incision site has spread to the legs. Order received to ambulate the patient and if patient is steady on her feet she can be discharged to home. 1320 - Patient ambulatory in the hallway accompanied by this nurse and Kemal Orellana CNA. Patient's gait steady. Patient, patient's mother, and sister informed that if the sensation to the patient's legs does not improve or worsens contact Dr. Alonzo Fails or go the nearest ER if she cannot reach him. Family verbalized understanding of instructions.

## 2017-12-08 NOTE — IP AVS SNAPSHOT
Blanka Idol 
 
 
 [address] [address] 
[phone] Patient: Tiffany Gatica MRN: [id number] DHY:[birth year]73[id number] About your hospitalization You were admitted on:  December 8, 2017 You last received care in the:  [facility] PACU You were discharged on:  December 8, 2017 Why you were hospitalized Your primary diagnosis was:  Not on File Things You Need To Do (next 8 weeks) Follow up with Elinor Lanes, MD  
  
Phone:  [phone] Where:  [address] [place] [address], [address] [place] [phone] Schedule an appointment with Sheela Newsome MD as soon as possible for a visit in 3 week(s) Phone:  [phone] Where:  [address] [facility], [address] [facility] [address] Discharge Orders None A check amy indicates which time of day the medication should be taken. My Medications STOP taking these medications   
 naproxen 500 mg tablet Commonly known as:  NAPROSYN  
   
  
  
TAKE these medications as instructed Instructions Each Dose to Equal  
 Morning Noon Evening Bedtime  
 chlorthalidone 25 mg tablet Commonly known as:  Annelle Sony Your last dose was: Your next dose is: TAKE ONE TABLET BY MOUTH ONCE DAILY  
     
   
   
   
  
 eletriptan 40 mg tablet Commonly known as:  RELPAX Your last dose was: Your next dose is: Take 1 Tab by mouth once as needed for 1 dose. may repeat in 2 hours if necessary 40 mg  
    
   
   
   
  
 ergocalciferol 50,000 unit capsule Commonly known as:  ERGOCALCIFEROL Your last dose was: Your next dose is: Take 1 Cap by mouth every seven (7) days. [address] Units  
    
   
   
   
  
 oxyCODONE-acetaminophen 5-325 mg per tablet Commonly known as:  PERCOCET Your last dose was: Your next dose is: Take 1 Tab by mouth every six (6) hours as needed for Pain. Max Daily Amount: 4 Tabs. 1 Tab Where to Get Your Medications Information on where to get these meds will be given to you by the nurse or doctor. ! Ask your nurse or doctor about these medications  
  oxyCODONE-acetaminophen 5-325 mg per tablet Discharge Instructions Instructions After Hemorrhoidectomy A packing was placed. It will fall out on its own and that is OK. Apply dry dressings on your bottom as necessary. Please take 4 dulcolax tablets tonight Please take 2 tablespoons of mineral oil daily for 5 days starting tomorrow Then STOP mineral oil and start metamucil daily Sitz baths for comfort Percocet for pain Avoid Aspirin or ibuprofen (Advil, Aleve, Motrin), Tylenol is OK Please call the office if you have not had a bowel movement in 3 days. DISCHARGE SUMMARY from Nurse PATIENT INSTRUCTIONS: 
 
After general anesthesia or intravenous sedation, for 24 hours or while taking prescription Narcotics: · Limit your activities · Do not drive and operate hazardous machinery · Do not make important personal or business decisions · Do  not drink alcoholic beverages · If you have not urinated within 8 hours after discharge, please contact your surgeon on call. Report the following to your surgeon: 
· Excessive pain, swelling, redness or odor of or around the surgical area · Temperature over 100.5 · Nausea and vomiting lasting longer than 4 hours or if unable to take medications · Any signs of decreased circulation or nerve impairment to extremity: change in color, persistent  numbness, tingling, coldness or increase pain · Any questions *  Please give a list of your current medications to your Primary Care Provider.  
 
*  Please update this list whenever your medications are discontinued, doses are 
 changed, or new medications (including over-the-counter products) are added. *  Please carry medication information at all times in case of emergency situations. These are general instructions for a healthy lifestyle: No smoking/ No tobacco products/ Avoid exposure to second hand smoke Surgeon General's Warning:  Quitting smoking now greatly reduces serious risk to your health. Obesity, smoking, and sedentary lifestyle greatly increases your risk for illness A healthy diet, regular physical exercise & weight monitoring are important for maintaining a healthy lifestyle You may be retaining fluid if you have a history of heart failure or if you experience any of the following symptoms:  Weight gain of 3 pounds or more overnight or 5 pounds in a week, increased swelling in our hands or feet or shortness of breath while lying flat in bed. Please call your doctor as soon as you notice any of these symptoms; do not wait until your next office visit. Recognize signs and symptoms of STROKE: 
 
F-face looks uneven A-arms unable to move or move unevenly S-speech slurred or non-existent T-time-call 911 as soon as signs and symptoms begin-DO NOT go Back to bed or wait to see if you get better-TIME IS BRAIN. Warning Signs of HEART ATTACK Call 911 if you have these symptoms: 
? Chest discomfort. Most heart attacks involve discomfort in the center of the chest that lasts more than a few minutes, or that goes away and comes back. It can feel like uncomfortable pressure, squeezing, fullness, or pain. ? Discomfort in other areas of the upper body. Symptoms can include pain or discomfort in one or both arms, the back, neck, jaw, or stomach. ? Shortness of breath with or without chest discomfort. ? Other signs may include breaking out in a cold sweat, nausea, or lightheadedness. Don't wait more than five minutes to call [address]!  Fast action can save your life. Calling 911 is almost always the fastest way to get lifesaving treatment. Emergency Medical Services staff can begin treatment when they arrive  up to an hour sooner than if someone gets to the hospital by car. The discharge information has been reviewed with the patient and family. The patient and family verbalized understanding. Discharge medications reviewed with the patient and family and appropriate educational materials and side effects teaching were provided. ___________________________________________________________________________________________________________________________________ Oxycodone/Acetaminophen (Percocet, Roxicet) - (By mouth) Why this medicine is used:  
Treats pain. This medicine contains a narcotic pain reliever. Contact a nurse or doctor right away if you have: 
· Extreme weakness, shallow breathing, slow heartbeat · Sweating or cold, clammy skin · Skin blisters, rash, or peeling Common side effects: 
· Constipation · Nausea, vomiting · Tiredness © 2017 [address] Information is for End User's use only and may not be sold, redistributed or otherwise used for commercial purposes. Introducing [facility]! Dear Belen Meza: Thank you for requesting a Canpages account. Our records indicate that you already have an active Canpages account. You can access your account anytime at [email or web address] Did you know that you can access your hospital and ER discharge instructions at any time in Canpages? You can also review all of your test results from your hospital stay or ER visit. Additional Information If you have questions, please visit the Frequently Asked Questions section of the Canpages website at [email or web address] Remember, Canpages is NOT to be used for urgent needs. For medical emergencies, dial 911. Now available from your iPhone and Android! Providers Seen During Your Hospitalization Provider Specialty Primary office phone Tabitha Goodell, MD Colon and Rectal Surgery [phone] Your Primary Care Physician (PCP) Primary Care Physician Office Phone Office Fax [address], [address] [facility] [address] [phone] You are allergic to the following No active allergies Recent Documentation Height Weight BMI OB Status Smoking Status 1.803 m 97.7 kg 30.04 kg/m2 IUD Current Every Day Smoker Emergency Contacts Name Discharge Info Relation Home Work Mobile Lamar Figueroa DISCHARGE CAREGIVER [3] Other Relative [6] [phone] Patient Belongings The following personal items are in your possession at time of discharge: 
  Dental Appliances: None  Visual Aid: Glasses, Contacts             Clothing: Pants, Shirt, Jacket/Coat, Undergarments, Footwear    Other Valuables: Cell Phone Please provide this summary of care documentation to your next provider. Signatures-by signing, you are acknowledging that this After Visit Summary has been reviewed with you and you have received a copy. Patient Signature:  ____________________________________________________________ Date:  ____________________________________________________________  
  
Bela Sofi Provider Signature:  ____________________________________________________________ Date:  ____________________________________________________________

## 2017-12-08 NOTE — ANESTHESIA PREPROCEDURE EVALUATION
Anesthetic History   No history of anesthetic complications            Review of Systems / Medical History  Patient summary reviewed and pertinent labs reviewed    Pulmonary          Smoker         Neuro/Psych   Within defined limits           Cardiovascular    Hypertension: well controlled              Exercise tolerance: >4 METS     GI/Hepatic/Renal  Within defined limits              Endo/Other        Arthritis     Other Findings   Comments:   Risk Factors for Postoperative nausea/vomiting:       History of postoperative nausea/vomiting? NO       Female? YES       Motion sickness? NO       Intended opioid administration for postoperative analgesia? NO      Smoking Abstinence  Current Smoker? YES  Elective Surgery? YES  Seen preoperatively by anesthesiologist or proxy prior to day of surgery? YES  Pt abstained from smoking 24 hours prior to anesthesia?  NO         Physical Exam    Airway  Mallampati: II  TM Distance: 4 - 6 cm  Neck ROM: normal range of motion   Mouth opening: Normal     Cardiovascular  Regular rate and rhythm,  S1 and S2 normal,  no murmur, click, rub, or gallop             Dental  No notable dental hx       Pulmonary  Breath sounds clear to auscultation               Abdominal  GI exam deferred       Other Findings            Anesthetic Plan    ASA: 2  Anesthesia type: MAC          Induction: Intravenous  Anesthetic plan and risks discussed with: Patient

## 2017-12-08 NOTE — DISCHARGE INSTRUCTIONS
Instructions After Hemorrhoidectomy    A packing was placed. It will fall out on its own and that is OK. Apply dry dressings on your bottom as necessary. Please take 4 dulcolax tablets tonight  Please take 2 tablespoons of mineral oil daily for 5 days starting tomorrow  Then STOP mineral oil and start metamucil daily  Sitz baths for comfort  Percocet for pain  Avoid Aspirin or ibuprofen (Advil, Aleve, Motrin), Tylenol is OK  Please call the office if you have not had a bowel movement in 3 days. DISCHARGE SUMMARY from Nurse    PATIENT INSTRUCTIONS:    After general anesthesia or intravenous sedation, for 24 hours or while taking prescription Narcotics:  · Limit your activities  · Do not drive and operate hazardous machinery  · Do not make important personal or business decisions  · Do  not drink alcoholic beverages  · If you have not urinated within 8 hours after discharge, please contact your surgeon on call. Report the following to your surgeon:  · Excessive pain, swelling, redness or odor of or around the surgical area  · Temperature over 100.5  · Nausea and vomiting lasting longer than 4 hours or if unable to take medications  · Any signs of decreased circulation or nerve impairment to extremity: change in color, persistent  numbness, tingling, coldness or increase pain  · Any questions    *  Please give a list of your current medications to your Primary Care Provider. *  Please update this list whenever your medications are discontinued, doses are      changed, or new medications (including over-the-counter products) are added. *  Please carry medication information at all times in case of emergency situations. These are general instructions for a healthy lifestyle:    No smoking/ No tobacco products/ Avoid exposure to second hand smoke  Surgeon General's Warning:  Quitting smoking now greatly reduces serious risk to your health.     Obesity, smoking, and sedentary lifestyle greatly increases your risk for illness    A healthy diet, regular physical exercise & weight monitoring are important for maintaining a healthy lifestyle    You may be retaining fluid if you have a history of heart failure or if you experience any of the following symptoms:  Weight gain of 3 pounds or more overnight or 5 pounds in a week, increased swelling in our hands or feet or shortness of breath while lying flat in bed. Please call your doctor as soon as you notice any of these symptoms; do not wait until your next office visit. Recognize signs and symptoms of STROKE:    F-face looks uneven    A-arms unable to move or move unevenly    S-speech slurred or non-existent    T-time-call 911 as soon as signs and symptoms begin-DO NOT go       Back to bed or wait to see if you get better-TIME IS BRAIN. Warning Signs of HEART ATTACK     Call 911 if you have these symptoms:   Chest discomfort. Most heart attacks involve discomfort in the center of the chest that lasts more than a few minutes, or that goes away and comes back. It can feel like uncomfortable pressure, squeezing, fullness, or pain.  Discomfort in other areas of the upper body. Symptoms can include pain or discomfort in one or both arms, the back, neck, jaw, or stomach.  Shortness of breath with or without chest discomfort.  Other signs may include breaking out in a cold sweat, nausea, or lightheadedness. Don't wait more than five minutes to call 911 - MINUTES MATTER! Fast action can save your life. Calling 911 is almost always the fastest way to get lifesaving treatment. Emergency Medical Services staff can begin treatment when they arrive -- up to an hour sooner than if someone gets to the hospital by car. The discharge information has been reviewed with the patient and family. The patient and family verbalized understanding.   Discharge medications reviewed with the patient and family and appropriate educational materials and side effects teaching were provided. ___________________________________________________________________________________________________________________________________   Oxycodone/Acetaminophen (Percocet, Roxicet) - (By mouth)   Why this medicine is used:   Treats pain. This medicine contains a narcotic pain reliever. Contact a nurse or doctor right away if you have:  · Extreme weakness, shallow breathing, slow heartbeat  · Sweating or cold, clammy skin  · Skin blisters, rash, or peeling     Common side effects:  · Constipation  · Nausea, vomiting  · Tiredness  © 2017 [employer] Information is for End User's use only and may not be sold, redistributed or otherwise used for commercial purposes.

## 2017-12-08 NOTE — INTERVAL H&P NOTE
H&P Update:  Tena Hardin was seen and examined. History and physical has been reviewed. The patient has been examined.  There have been no significant clinical changes since the completion of the originally dated History and Physical.    Signed By: Radha Knapp MD     December 8, 2017 10:28 AM

## 2017-12-08 NOTE — H&P (VIEW-ONLY)
HPI: Tiffany Gatica is a 40 y.o. female presenting with chief complain of hemorrhoids. She has swelling and bright red blood per rectum. She has had symptoms for the last 2 years. She requires manual reduction of the hemorrhoids. She is maintain a high-fiber diet and this is not resolve the issue. She sees blood on toilet paper, in the toilet bowl and sometimes mixed with stool. She has tried preparation H wipes without effect. She has some occasional nausea and abdominal pain. She has lost 15 pounds in the last 2 months. She has 1 bowel movement per day. She has occasional constipation and diarrhea. She denies fecal incontinence. She had one episode of inability to discern flatus from stool. She has never had a colonoscopy.     Past Medical History:   Diagnosis Date    Chondromalacia of left patella     Epicondylitis     right elbow, lateral    Epicondylitis, lateral 11/2013    right elbow    Essential hypertension 10/24/2013    Headache, migraine     HTN (hypertension)     Knee pain     Right ankle pain     Wears glasses        Past Surgical History:   Procedure Laterality Date    HX BREAST REDUCTION      04/2012    HX GYN      vaginal delivery x 1    HX OTHER SURGICAL      oral surgery, unspecified        Family History   Problem Relation Age of Onset    Cancer Other     Hypertension Other     Diabetes Other        Social History     Social History    Marital status: SINGLE     Spouse name: N/A    Number of children: N/A    Years of education: N/A     Social History Main Topics    Smoking status: Current Every Day Smoker     Packs/day: 0.25     Types: Cigarettes    Smokeless tobacco: Never Used    Alcohol use 2.5 oz/week     5 Standard drinks or equivalent per week      Comment: occassional    Drug use: No    Sexual activity: Yes     Partners: Male     Other Topics Concern    None     Social History Narrative       Review of Systems - Review of Systems Constitutional: Positive for malaise/fatigue. Negative for chills, diaphoresis, fever and weight loss. HENT: Negative. Eyes: Negative. Respiratory: Negative. Cardiovascular: Positive for leg swelling. Negative for chest pain, palpitations, orthopnea, claudication and PND. Gastrointestinal: Positive for abdominal pain, blood in stool and constipation. Negative for heartburn, melena, nausea and vomiting. Genitourinary: Negative. Musculoskeletal: Negative. Skin: Negative. Neurological: Positive for tingling. Negative for dizziness, tremors, sensory change, speech change, focal weakness, seizures, loss of consciousness, weakness and headaches. Endo/Heme/Allergies: Negative. Psychiatric/Behavioral: Negative. Outpatient Prescriptions Marked as Taking for the 11/27/17 encounter (Office Visit) with Addy Chen MD   Medication Sig Dispense Refill    ergocalciferol (ERGOCALCIFEROL) 50,000 unit capsule Take 1 Cap by mouth every seven (7) days. 12 Cap 3    chlorthalidone (HYGROTEN) 25 mg tablet TAKE ONE TABLET BY MOUTH ONCE DAILY 30 Tab 5    naproxen (NAPROSYN) 500 mg tablet Take 1 Tab by mouth two (2) times daily (with meals). 60 Tab 1    eletriptan (RELPAX) 40 mg tablet Take 1 Tab by mouth once as needed for 1 dose. may repeat in 2 hours if necessary 18 Tab 5       No Known Allergies    Vitals:    11/27/17 0936   BP: 106/70   Pulse: 91   Resp: 18   Temp: 98.4 °F (36.9 °C)   Weight: 98.9 kg (218 lb)   Height: 5' 11\" (1.803 m)   PainSc:   0 - No pain       Physical Exam   Constitutional: She appears well-developed and well-nourished. HENT:   Head: Normocephalic and atraumatic. Eyes: Conjunctivae and EOM are normal.   Abdominal: Soft. She exhibits no distension. There is no tenderness. Musculoskeletal: Normal range of motion. Lymphadenopathy:     She has no cervical adenopathy. Right: No inguinal adenopathy present. Left: No inguinal adenopathy present. Neurological: She exhibits normal muscle tone. Skin: Skin is warm and dry. No rash noted. Psychiatric: She has a normal mood and affect. Her speech is normal.   Rectum: Severe circumferential external hemorrhoids with a small amount of protruding internal hemorrhoid  Digital rectal exam: Slightly increased tone, no mass  Anoscopy: Minimal internal hemorrhoidal disease    Assessment / Plan    Grade 3 hemorrhoids  Schedule hemorrhoidectomy    Rectal bleeding  will need colonoscopy after recovery from hemorrhoidectomy    The diagnoses and plan were discussed with the patient. All questions answered. Plan of care agreed to by all concerned.

## 2017-12-08 NOTE — PERIOP NOTES
I have reviewed discharge instructions with the patient, parent and sister. The patient, parent and sister verbalized understanding. Patient armband removed and shredded.

## 2017-12-09 NOTE — OP NOTES
[address]    Name:  Kylie Coughlin  MR#:  [id number]  :  1980  Account #:  [de-identified]  Date of Adm:  2017  Date of Surgery:  2017      PREOPERATIVE DIAGNOSIS: Grade III hemorrhoids. POSTOPERATIVE DIAGNOSIS: Grade III hemorrhoids. PROCEDURES PERFORMED: Hemorrhoidectomy, 3 quadrant. SURGEON: Corby Li. Fernando Morales MD.    ANESTHESIA: MAC.    ESTIMATED BLOOD LOSS: 50 mL. SPECIMENS REMOVED: Hemorrhoids to pathology. COMPLICATIONS:    INDICATIONS: The patient is a 40-year-old woman with chronically  prolapsing uncomfortable hemorrhoids. She was brought to the  operating room for excision. I explained the risks of the procedure  including bleeding, infection, and recurrence. I told her, given the  circumferential nature of her disease, a small amount of hemorrhoidal  tissue may be left behind for adequate healing. She understood and  wished to proceed. DESCRIPTION OF PROCEDURE: The patient was properly identified  in the holding area, brought to the operating room. She was placed in  the prone jackknife position. Sedation was administered by  Anesthesia. The buttocks were taped apart. Perianal area was  prepped and draped in the usual sterile fashion. 40 mL of local  anesthetic was injected as a local block. Digital rectal exam was  performed and was normal. She had very large prolapsing  hemorrhoids in the right anterior, posterior and left lateral quadrants. All tissue was excised with Metzenbaum scissors by grasping the apex  and excising down to the pedicle, which was suture ligated with 2-0  Vicryl suture. Hemostasis was assured with cautery. Some additional  tissue was taken off of the posterior portion of the left lateral region. This was closer to the posterior midline. Additionally, some of the  mucosa in this region was prolapsing downward. This was pexy'd with  a 2-0 Vicryl suture to inhibit prolapse. Hemostasis was assured. The  rectum was irrigated. Clean Exparel was injected as a long-term local  block. Dry sterile dressings were applied. The patient tolerated the procedure well. All instrument, sponge and  needle counts were correct at the end of the case x2. The patient  awoke from anesthesia, was transported to the PACU in stable  condition. MD Venancio Esteban / Stevo Galeas  D:  12/08/2017   11:50  T:  12/08/2017   18:30  Job #:  [id number]

## 2017-12-13 ENCOUNTER — TELEPHONE (OUTPATIENT)
Dept: SURGERY | Age: 37
End: 2017-12-13

## 2017-12-13 RX ORDER — SENNOSIDES 25 MG/1
TABLET, FILM COATED ORAL
Qty: 45 G | Refills: 0 | Status: SHIPPED | OUTPATIENT
Start: 2017-12-13

## 2017-12-13 NOTE — TELEPHONE ENCOUNTER
Pt called requesting lidocaine gel is taking sitz baths and stool softeners as directed no fever bowels moving discussed with dr jones and see rx writer

## 2018-01-05 ENCOUNTER — TELEPHONE (OUTPATIENT)
Dept: FAMILY MEDICINE CLINIC | Age: 38
End: 2018-01-05

## 2018-01-05 ENCOUNTER — DOCUMENTATION ONLY (OUTPATIENT)
Dept: FAMILY MEDICINE CLINIC | Age: 38
End: 2018-01-05

## 2018-01-05 NOTE — TELEPHONE ENCOUNTER
Call to patient to inform her of breast cancer screening recommendation. No answer. Message left with no information given. Requesting a return call.  Natan Carrasco

## 2018-01-05 NOTE — PROGRESS NOTES
Spoke with Marcus Rome at [phone] at [facility], [facility]. She indicates that an MRI is not needed at this time. However it should be ordered in six months with patient's next Mammogram. Patient should have clinical breast exam every 6 months and annual mammograms and annual MRI's due to lifetime risk of developing breast cancer which is greater than 20%.  [facility]

## 2018-01-08 NOTE — TELEPHONE ENCOUNTER
Made contact with patient. Informed her of SHEILA Pretty instructions per SHEILA Pretty note. Patient verbalized understanding.

## 2018-04-25 ENCOUNTER — HOSPITAL ENCOUNTER (OUTPATIENT)
Dept: LAB | Age: 38
Discharge: HOME | End: 2018-04-25

## 2018-04-25 ENCOUNTER — OFFICE VISIT (OUTPATIENT)
Dept: FAMILY MEDICINE CLINIC | Age: 38
End: 2018-04-25

## 2018-04-25 VITALS
DIASTOLIC BLOOD PRESSURE: 78 MMHG | OXYGEN SATURATION: 100 % | HEIGHT: 71 IN | SYSTOLIC BLOOD PRESSURE: 138 MMHG | WEIGHT: 222 LBS | BODY MASS INDEX: 31.08 KG/M2 | RESPIRATION RATE: 16 BRPM | HEART RATE: 64 BPM | TEMPERATURE: 97.9 F

## 2018-04-25 DIAGNOSIS — R31.9 HEMATURIA, UNSPECIFIED TYPE: ICD-10-CM

## 2018-04-25 DIAGNOSIS — Z97.5 IUD CONTRACEPTION: ICD-10-CM

## 2018-04-25 DIAGNOSIS — I10 ESSENTIAL HYPERTENSION: Primary | ICD-10-CM

## 2018-04-25 DIAGNOSIS — N89.8 VAGINAL ODOR: ICD-10-CM

## 2018-04-25 DIAGNOSIS — N89.8 VAGINAL DISCHARGE: ICD-10-CM

## 2018-04-25 DIAGNOSIS — E55.9 VITAMIN D DEFICIENCY: ICD-10-CM

## 2018-04-25 LAB
BILIRUB UR QL STRIP: NEGATIVE
GLUCOSE UR-MCNC: NEGATIVE MG/DL
KETONES P FAST UR STRIP-MCNC: NORMAL MG/DL
PH UR STRIP: 6 [PH] (ref 4.6–8)
PROT UR QL STRIP: NEGATIVE
SP GR UR STRIP: 1.03 (ref 1–1.03)
UA UROBILINOGEN AMB POC: NORMAL (ref 0.2–1)
URINALYSIS CLARITY POC: CLEAR
URINALYSIS COLOR POC: YELLOW
URINE BLOOD POC: NORMAL
URINE LEUKOCYTES POC: NEGATIVE
URINE NITRITES POC: NEGATIVE

## 2018-04-25 PROCEDURE — 99001 SPECIMEN HANDLING PT-LAB: CPT | Performed by: NURSE PRACTITIONER

## 2018-04-25 RX ORDER — ERGOCALCIFEROL 1.25 MG/1
50000 CAPSULE ORAL
Qty: 12 CAP | Refills: 3 | Status: CANCELLED | OUTPATIENT
Start: 2018-04-25

## 2018-04-25 RX ORDER — METRONIDAZOLE 500 MG/1
500 TABLET ORAL 2 TIMES DAILY
Qty: 14 TAB | Refills: 0 | Status: SHIPPED | OUTPATIENT
Start: 2018-04-25 | End: 2018-05-02

## 2018-04-25 NOTE — PROGRESS NOTES
JUN Eric is a 45 y.o. female  Chief Complaint   Patient presents with    Follow-up     Patient was seen 5 months ago for a well woman and has returned to go over some labs. Patient also states that at her last office visit she was told she had a bacterial infection and feels as if she may have one again based on her symptoms. Patient also states that she is looking for a new GYN to have the IUD removed as advised by Provider at last visit. Reports fishy odor and white discharge. Denies itching, denies nausea or vomiting. Denies abdominal pain. Denies fevers, chills, hematuria, pain on urination, frequency or urgency. Reports she is now moving to [place] and looking for a GYN in that area. Vitamin D- reports she has completed her medications. Denies leg pains. Hypertension - Denies chest pain and or shortness of breath. Past Medical History  Past Medical History:   Diagnosis Date    Chondromalacia of left patella     Epicondylitis     right elbow, lateral    Epicondylitis, lateral 11/2013    right elbow    Essential hypertension 10/24/2013    Headache, migraine     HTN (hypertension)     Knee pain     Right ankle pain     Wears glasses        Surgical History  Past Surgical History:   Procedure Laterality Date    HX BREAST REDUCTION      04/2012    HX GYN      vaginal delivery x 1    HX OTHER SURGICAL      oral surgery, unspecified     CT HEMORRHOIDECTOMY,EXTERNAL, 2+ COLUMNS/GROUPS N/A 12/08/2017    Dr. Karey French        Medications  Current Outpatient Prescriptions   Medication Sig Dispense Refill    metroNIDAZOLE (FLAGYL) 500 mg tablet Take 1 Tab by mouth two (2) times a day for 7 days. 14 Tab 0    ergocalciferol (ERGOCALCIFEROL) 50,000 unit capsule Take 1 Cap by mouth every seven (7) days.  12 Cap 3    chlorthalidone (HYGROTEN) 25 mg tablet TAKE ONE TABLET BY MOUTH ONCE DAILY 30 Tab 5    eletriptan (RELPAX) 40 mg tablet Take 1 Tab by mouth once as needed for 1 dose. may repeat in 2 hours if necessary 18 Tab 5    lidocaine 5 % topical cream Apply  to affected area two (2) times daily as needed for Itching. 45 g 0    oxyCODONE-acetaminophen (PERCOCET) 5-325 mg per tablet Take 1 Tab by mouth every six (6) hours as needed for Pain. Max Daily Amount: 4 Tabs. 36 Tab 0       Allergies  No Known Allergies    Family History  Family History   Problem Relation Age of Onset    Cancer Other     Hypertension Other     Diabetes Other        Social History  Social History     Social History    Marital status: SINGLE     Spouse name: N/A    Number of children: N/A    Years of education: N/A     Occupational History    Not on file. Social History Main Topics    Smoking status: Current Every Day Smoker     Packs/day: 0.25     Types: Cigarettes    Smokeless tobacco: Never Used    Alcohol use 2.5 oz/week     5 Standard drinks or equivalent per week      Comment: occassional    Drug use: No    Sexual activity: Yes     Partners: Male     Other Topics Concern    Not on file     Social History Narrative       Problem List  Patient Active Problem List   Diagnosis Code    Family history of hypertension Z82.49    Family history of diabetes mellitus (DM) Z83.3    Essential hypertension I10    Thrombocytopenia ([facility] [address] D69.6    IUD contraception Z97.5       Review of Systems  Review of Systems   Constitutional: Negative for chills and fever. Respiratory: Negative for shortness of breath. Cardiovascular: Negative for chest pain. Gastrointestinal: Negative for abdominal pain, nausea and vomiting. Genitourinary: Negative for dysuria, frequency, hematuria and urgency. Musculoskeletal: Negative for myalgias.        Vital Signs  Vitals:    04/25/18 1431   BP: 138/78   Pulse: 64   Resp: 16   Temp: 97.9 °F (36.6 °C)   TempSrc: Oral   SpO2: 100%   Weight: 222 lb (100.7 kg)   Height: 5' 11\" (1.803 m)   PainSc:   0 - No pain       Physical Exam  Physical Exam Constitutional: She is oriented to person, place, and time. HENT:   Mouth/Throat: Oropharynx is clear and moist.   Cardiovascular: Normal rate, regular rhythm and normal heart sounds. No murmur heard. Pulmonary/Chest: Effort normal and breath sounds normal. No respiratory distress. Abdominal: Soft. Bowel sounds are normal. She exhibits no distension. There is tenderness (reports mild pressure on palpation) in the suprapubic area. Neurological: She is alert and oriented to person, place, and time. Diagnostics  Orders Placed This Encounter    VITAMIN D, 25 HYDROXY     Standing Status:   Future     Standing Expiration Date:   4/25/2019    CBC WITH AUTOMATED DIFF     Standing Status:   Future     Standing Expiration Date:   3/38/5448    METABOLIC PANEL, COMPREHENSIVE     Standing Status:   Future     Standing Expiration Date:   10/23/2018    HEMOGLOBIN A1C WITH EAG     Standing Status:   Future     Standing Expiration Date:   4/26/2019    CBC WITH AUTOMATED DIFF    METABOLIC PANEL, COMPREHENSIVE    HEMOGLOBIN A1C WITH EAG    VITAMIN D, 25 HYDROXY    REFERRAL TO GYNECOLOGY     Referral Priority:   Routine     Referral Type:   Consultation     Referral Reason:   Specialty Services Required     Referral Location:   [address] [place] [address]     Referred to Provider:   Austin Cannon MD     Requested Specialty:   Gynecology    AMB POC URINALYSIS DIP STICK OR TABLET REAGENT AUTO W/O MICRO    metroNIDAZOLE (FLAGYL) 500 mg tablet     Sig: Take 1 Tab by mouth two (2) times a day for 7 days.      Dispense:  14 Tab     Refill:  0       Results  Results for orders placed or performed in visit on 04/25/18   CBC WITH AUTOMATED DIFF   Result Value Ref Range    WBC 5.2 3.4 - 10.8 x10E3/uL    RBC 4.75 3.77 - 5.28 x10E6/uL    HGB 13.6 11.1 - 15.9 g/dL    HCT 41.9 34.0 - 46.6 %    MCV 88 79 - 97 fL    MCH 28.6 26.6 - 33.0 pg    MCHC 32.5 31.5 - 35.7 g/dL    RDW 13.7 12.3 - 15.4 %    PLATELET 361 151 - 831 x10E3/uL    NEUTROPHILS 46 Not Estab. %    Lymphocytes 43 Not Estab. %    MONOCYTES 7 Not Estab. %    EOSINOPHILS 4 Not Estab. %    BASOPHILS 0 Not Estab. %    ABS. NEUTROPHILS 2.4 1.4 - 7.0 x10E3/uL    Abs Lymphocytes 2.2 0.7 - 3.1 x10E3/uL    ABS. MONOCYTES 0.4 0.1 - 0.9 x10E3/uL    ABS. EOSINOPHILS 0.2 0.0 - 0.4 x10E3/uL    ABS. BASOPHILS 0.0 0.0 - 0.2 x10E3/uL    IMMATURE GRANULOCYTES 0 Not Estab. %    ABS. IMM. GRANS. 0.0 0.0 - 0.1 U23S7/KY   METABOLIC PANEL, COMPREHENSIVE   Result Value Ref Range    Glucose 59 (L) 65 - 99 mg/dL    BUN 12 6 - 20 mg/dL    Creatinine 0.65 0.57 - 1.00 mg/dL    GFR est non- >59 mL/min/1.73    GFR est  >59 mL/min/1.73    BUN/Creatinine ratio 18 9 - 23    Sodium 141 134 - 144 mmol/L    Potassium 3.7 3.5 - 5.2 mmol/L    Chloride 101 96 - 106 mmol/L    CO2 23 18 - 29 mmol/L    Calcium 9.0 8.7 - 10.2 mg/dL    Protein, total 6.6 6.0 - 8.5 g/dL    Albumin 4.1 3.5 - 5.5 g/dL    GLOBULIN, TOTAL 2.5 1.5 - 4.5 g/dL    A-G Ratio 1.6 1.2 - 2.2    Bilirubin, total 0.5 0.0 - 1.2 mg/dL    Alk. phosphatase 49 39 - 117 IU/L    AST (SGOT) 14 0 - 40 IU/L    ALT (SGPT) 13 0 - 32 IU/L   HEMOGLOBIN A1C WITH EAG   Result Value Ref Range    Hemoglobin A1c 4.9 4.8 - 5.6 %    Estimated average glucose 94 mg/dL   VITAMIN D, 25 HYDROXY   Result Value Ref Range    VITAMIN D, 25-HYDROXY 21.1 (L) 30.0 - 100.0 ng/mL   AMB POC URINALYSIS DIP STICK AUTO W/O MICRO   Result Value Ref Range    Color (UA POC) Yellow     Clarity (UA POC) Clear     Glucose (UA POC) Negative Negative    Bilirubin (UA POC) Negative Negative    Ketones (UA POC) Trace Negative    Specific gravity (UA POC) 1.030 1.001 - 1.035    Blood (UA POC) Trace Negative    pH (UA POC) 6.0 4.6 - 8.0    Protein (UA POC) Negative Negative    Urobilinogen (UA POC) 0.2 mg/dL 0.2 - 1    Nitrites (UA POC) Negative Negative    Leukocyte esterase (UA POC) Negative Negative         Assessment and Plan  Diagnoses and all orders for this visit:    1. Essential hypertension  -     CBC WITH AUTOMATED DIFF; Future  -     METABOLIC PANEL, COMPREHENSIVE; Future  -     HEMOGLOBIN A1C WITH EAG; Future    2. Vitamin D deficiency  -     VITAMIN D, 25 HYDROXY; Future  -     HEMOGLOBIN A1C WITH EAG; Future    3. Vaginal odor  -     metroNIDAZOLE (FLAGYL) 500 mg tablet; Take 1 Tab by mouth two (2) times a day for 7 days.  -     REFERRAL TO GYNECOLOGY    4. Vaginal discharge  -     metroNIDAZOLE (FLAGYL) 500 mg tablet; Take 1 Tab by mouth two (2) times a day for 7 days.  -     REFERRAL TO GYNECOLOGY    5. IUD contraception  -     REFERRAL TO GYNECOLOGY    6. Hematuria, unspecified type  -     AMB POC URINALYSIS DIP STICK OR TABLET REAGENT AUTO W/O MICRO    Other orders  -     CBC WITH AUTOMATED DIFF  -     METABOLIC PANEL, COMPREHENSIVE  -     HEMOGLOBIN A1C WITH EAG  -     VITAMIN D, 25 HYDROXY    With patients history of BV will treat as patient is symptomatic. ROSARIO Mcdaniel  Medication, side effects, possible allergic reactions and warnings reviewed with patient. Patient verbalized understanding. After care summary printed and reviewed with patient. Plan reviewed with patient. Questions answered. Patient verbalized understanding of plan and is in agreement with plan. Patient to follow up as needed or earlier if symptoms worsen. Referral placed for GYN in [place] as she is moving to this area. Patient to secure a PCP in the [place] area we will continue to follow until she does.      ROSARIO Valdes

## 2018-04-25 NOTE — MR AVS SNAPSHOT
[facility] 
 
 
 [address] [place] [address] 
[phone] Patient: Rhonda Perry MRN: [id number] Visit Information Date & Time Provider Department Dept. Phone Encounter #  
 4/25/2018  2:15 PM Seth Saenz NP [facility] [phone] [id number] Upcoming Health Maintenance Date Due Pneumococcal 19-64 Medium Risk (1 of 1 - PPSV23) 3/23/1999 DTaP/Tdap/Td series (1 - Tdap) 3/23/2001 PAP AKA CERVICAL CYTOLOGY 11/6/2018 BREAST CANCER SCRN MAMMOGRAM 11/27/2018 Allergies as of 4/25/2018  Review Complete On: 4/25/2018 By: Seth Saenz NP No Known Allergies Current Immunizations  Never Reviewed No immunizations on file. Not reviewed this visit You Were Diagnosed With   
  
 Codes Comments Essential hypertension    -  Primary ICD-10-CM: I10 
ICD-9-CM: 401.9 Vitamin D deficiency     ICD-10-CM: E55.9 ICD-9-CM: 268.9 Vaginal odor     ICD-10-CM: N89.8 ICD-9-CM: 625.8 Vaginal discharge     ICD-10-CM: N89.8 ICD-9-CM: 623.5 IUD contraception     ICD-10-CM: Z97.5 ICD-9-CM: V45.51 Vitals BP Pulse Temp Resp Height(growth percentile) Weight(growth percentile) 138/78 (BP 1 Location: Right arm, BP Patient Position: Sitting) 64 97.9 °F (36.6 °C) (Oral) 16 5' 11\" (1.803 m) 222 lb (100.7 kg) SpO2 BMI OB Status Smoking Status 100% 30.96 kg/m2 IUD Current Every Day Smoker BMI and BSA Data Body Mass Index Body Surface Area 30.96 kg/m 2 2.25 m 2 Preferred Pharmacy Pharmacy Name Phone [facility] [address] [facility] [phone] Your Updated Medication List  
  
   
This list is accurate as of 4/25/18  3:15 PM.  Always use your most recent med list.  
  
  
  
  
 chlorthalidone 25 mg tablet Commonly known as:  Yuri Vee  
 TAKE ONE TABLET BY MOUTH ONCE DAILY  
  
 eletriptan 40 mg tablet Commonly known as:  RELPAX Take 1 Tab by mouth once as needed for 1 dose. may repeat in 2 hours if necessary  
  
 ergocalciferol 50,000 unit capsule Commonly known as:  ERGOCALCIFEROL Take 1 Cap by mouth every seven (7) days. lidocaine 5 % topical cream  
Apply  to affected area two (2) times daily as needed for Itching. metroNIDAZOLE 500 mg tablet Commonly known as:  FLAGYL Take 1 Tab by mouth two (2) times a day for 7 days. oxyCODONE-acetaminophen 5-325 mg per tablet Commonly known as:  PERCOCET Take 1 Tab by mouth every six (6) hours as needed for Pain. Max Daily Amount: 4 Tabs. Prescriptions Sent to Pharmacy Refills  
 metroNIDAZOLE (FLAGYL) 500 mg tablet 0 Sig: Take 1 Tab by mouth two (2) times a day for 7 days. Class: Normal  
 Pharmacy: [facility] [place] [address] [place] [facility] [place] [address] Ph #: [phone] Route: Oral  
  
To-Do List   
 04/25/2018 Lab:  CBC WITH AUTOMATED DIFF   
  
 04/25/2018 Lab:  HEMOGLOBIN A1C WITH EAG   
  
 04/25/2018 Lab:  VITAMIN D, 25 HYDROXY Around 07/24/2018 Lab:  METABOLIC PANEL, COMPREHENSIVE Patient Instructions Please contact our office if you have any questions about your visit today. Introducing [facility] & HEALTH SERVICES! Dear Jennifer Quiroz: Thank you for requesting a Teneros account. Our records indicate that you already have an active Teneros account. You can access your account anytime at [email or web address] Did you know that you can access your hospital and ER discharge instructions at any time in Teneros? You can also review all of your test results from your hospital stay or ER visit. Additional Information If you have questions, please visit the Frequently Asked Questions section of the [employer] website at [email or web address] Remember, [employer] is NOT to be used for urgent needs. For medical emergencies, dial 911. Now available from your iPhone and Android! Please provide this summary of care documentation to your next provider. Your primary care clinician is listed as Bj Pac. If you have any questions after today's visit, please call [phone].

## 2018-04-25 NOTE — PROGRESS NOTES
Chief Complaint   Patient presents with    Follow-up     Patient was seen 5 months ago for a well woman and has returned to go over some labs. Patient also states that at her last office visit she was told she had a bacterial infection and feels as if she may have one again based on her symptoms. Patient also states that she is looking for a new GYN to have the IUD removed as advised by Provider at last visit. 1. Have you been to the ER, urgent care clinic since your last visit? Hospitalized since your last visit? No    2. Have you seen or consulted any other health care providers outside of the [address] [place] [address] since your last visit? Include any pap smears or colon screening.  No     Health Maintenance Due   Topic Date Due    Pneumococcal 19-64 Medium Risk (1 of 1 - PPSV23) 03/23/1999    DTaP/Tdap/Td series (1 - Tdap) 03/23/2001

## 2018-04-26 DIAGNOSIS — E55.9 VITAMIN D DEFICIENCY: ICD-10-CM

## 2018-04-26 DIAGNOSIS — I10 ESSENTIAL HYPERTENSION: ICD-10-CM

## 2018-04-26 LAB
25(OH)D3+25(OH)D2 SERPL-MCNC: 21.1 NG/ML (ref 30–100)
ALBUMIN SERPL-MCNC: 4.1 G/DL (ref 3.5–5.5)
ALBUMIN/GLOB SERPL: 1.6 {RATIO} (ref 1.2–2.2)
ALP SERPL-CCNC: 49 IU/L (ref 39–117)
ALT SERPL-CCNC: 13 IU/L (ref 0–32)
AST SERPL-CCNC: 14 IU/L (ref 0–40)
BASOPHILS # BLD AUTO: 0 X10E3/UL (ref 0–0.2)
BASOPHILS NFR BLD AUTO: 0 %
BILIRUB SERPL-MCNC: 0.5 MG/DL (ref 0–1.2)
BUN SERPL-MCNC: 12 MG/DL (ref 6–20)
BUN/CREAT SERPL: 18 (ref 9–23)
CALCIUM SERPL-MCNC: 9 MG/DL (ref 8.7–10.2)
CHLORIDE SERPL-SCNC: 101 MMOL/L (ref 96–106)
CO2 SERPL-SCNC: 23 MMOL/L (ref 18–29)
CREAT SERPL-MCNC: 0.65 MG/DL (ref 0.57–1)
EOSINOPHIL # BLD AUTO: 0.2 X10E3/UL (ref 0–0.4)
EOSINOPHIL NFR BLD AUTO: 4 %
ERYTHROCYTE [DISTWIDTH] IN BLOOD BY AUTOMATED COUNT: 13.7 % (ref 12.3–15.4)
EST. AVERAGE GLUCOSE BLD GHB EST-MCNC: 94 MG/DL
GFR SERPLBLD CREATININE-BSD FMLA CKD-EPI: 113 ML/MIN/1.73
GFR SERPLBLD CREATININE-BSD FMLA CKD-EPI: 130 ML/MIN/1.73
GLOBULIN SER CALC-MCNC: 2.5 G/DL (ref 1.5–4.5)
GLUCOSE SERPL-MCNC: 59 MG/DL (ref 65–99)
HBA1C MFR BLD: 4.9 % (ref 4.8–5.6)
HCT VFR BLD AUTO: 41.9 % (ref 34–46.6)
HGB BLD-MCNC: 13.6 G/DL (ref 11.1–15.9)
IMM GRANULOCYTES # BLD: 0 X10E3/UL (ref 0–0.1)
IMM GRANULOCYTES NFR BLD: 0 %
LYMPHOCYTES # BLD AUTO: 2.2 X10E3/UL (ref 0.7–3.1)
LYMPHOCYTES NFR BLD AUTO: 43 %
MCH RBC QN AUTO: 28.6 PG (ref 26.6–33)
MCHC RBC AUTO-ENTMCNC: 32.5 G/DL (ref 31.5–35.7)
MCV RBC AUTO: 88 FL (ref 79–97)
MONOCYTES # BLD AUTO: 0.4 X10E3/UL (ref 0.1–0.9)
MONOCYTES NFR BLD AUTO: 7 %
NEUTROPHILS # BLD AUTO: 2.4 X10E3/UL (ref 1.4–7)
NEUTROPHILS NFR BLD AUTO: 46 %
PLATELET # BLD AUTO: 157 X10E3/UL (ref 150–379)
POTASSIUM SERPL-SCNC: 3.7 MMOL/L (ref 3.5–5.2)
PROT SERPL-MCNC: 6.6 G/DL (ref 6–8.5)
RBC # BLD AUTO: 4.75 X10E6/UL (ref 3.77–5.28)
SODIUM SERPL-SCNC: 141 MMOL/L (ref 134–144)
WBC # BLD AUTO: 5.2 X10E3/UL (ref 3.4–10.8)

## 2018-04-26 NOTE — TELEPHONE ENCOUNTER
Pt called requesting refill for medication . Requested Prescriptions     Pending Prescriptions Disp Refills    ergocalciferol (ERGOCALCIFEROL) 50,000 unit capsule 12 Cap 3     Sig: Take 1 Cap by mouth every seven (7) days.     chlorthalidone (HYGROTEN) 25 mg tablet 30 Tab 5

## 2018-04-29 RX ORDER — CHLORTHALIDONE 25 MG/1
TABLET ORAL
Qty: 30 TAB | Refills: 5 | Status: SHIPPED | OUTPATIENT
Start: 2018-04-29 | End: 2019-04-30 | Stop reason: SDUPTHER

## 2018-04-29 RX ORDER — ERGOCALCIFEROL 1.25 MG/1
50000 CAPSULE ORAL
Qty: 12 CAP | Refills: 3 | Status: SHIPPED | OUTPATIENT
Start: 2018-04-29

## 2018-05-15 ENCOUNTER — TELEPHONE (OUTPATIENT)
Dept: FAMILY MEDICINE CLINIC | Age: 38
End: 2018-05-15

## 2018-05-15 NOTE — TELEPHONE ENCOUNTER
Pt called and stated that she would like he BP med called to [facility] in [facility]. Pt stated that she will no longer have insurance and wants to know if a new med can be called in and switched so that she can have a 90 day supply for cheaper. Please advise.

## 2018-05-16 NOTE — TELEPHONE ENCOUNTER
Patient has been called however patient was no available and a voicemail was left asking the patient to call the office to discuss her request for 90 day medication supply.

## 2018-05-17 NOTE — TELEPHONE ENCOUNTER
Patient has been called and informed that Provider Nati Painter is willing to prescribe the patient a 90 day supply due to her insurance ending at the end of the month. Patient has been informed that any changes that need to be made will require an appointment and a 2 week follow up to said appointment. Patient had questions regarding a [employer] $10 plan. Patient has been informed that she will need to talk with [facility] about what medications are covered. Patient ask that refills be placed on hold until she speaks with [facility] and her current pharmacy of hetras.      Provider has been updated on patients request.

## 2019-06-17 DIAGNOSIS — I10 ESSENTIAL HYPERTENSION: ICD-10-CM

## 2019-06-17 NOTE — TELEPHONE ENCOUNTER
PCP: Fam Mejia MD    Last appt: 4/25/2018  No future appointments.     Requested Prescriptions     Pending Prescriptions Disp Refills    chlorthalidone (HYGROTEN) 25 mg tablet 30 Tab 1     Sig: TAKE 1 TABLET BY MOUTH EVERY DAY

## 2019-06-20 RX ORDER — CHLORTHALIDONE 25 MG/1
TABLET ORAL
Qty: 30 TAB | Refills: 1 | OUTPATIENT
Start: 2019-06-20

## 2019-08-22 ENCOUNTER — OP HISTORICAL/CONVERTED ENCOUNTER (OUTPATIENT)
Dept: OTHER | Age: 39
End: 2019-08-22

## 2020-11-30 RX ORDER — TRIAMTERENE/HYDROCHLOROTHIAZID 37.5-25 MG
TABLET ORAL
Qty: 30 TAB | Refills: 0 | Status: SHIPPED | OUTPATIENT
Start: 2020-11-30

## 2021-05-10 NOTE — BRIEF OP NOTE
BRIEF OPERATIVE NOTE    Date of Procedure: 12/8/2017   Preoperative Diagnosis: Grade III hemorrhoids [K64.2]  Postoperative Diagnosis: Grade III hemorrhoids [K64.2]    Procedure(s):   HEMORRHOIDECTOMY, 3 QUADRANT   Surgeon(s) and Role:     * Kassandra Jones MD - Primary         Assistant Staff:       Surgical Staff:  Circ-1: Kim Myers RN  Circ-Relief: Jacinda Barlow RN  Scrub Tech-1: Dalila St  Surg Asst-1: Buddy Winchester  Event Time In   Incision Start 1110   Incision Close      Anesthesia: MAC   Estimated Blood Loss: 20 ml  Specimens:   ID Type Source Tests Collected by Time Destination   1 : HEMORRHOID LEFT LATERAL Preservative Rectum  Kassandra Jones MD 12/8/2017 1122 Pathology   2 : [place] Preservative Rectum  Kassandra Jones MD 12/8/2017 1130 Pathology   3 : 1659 Carmel Nova MD 12/8/2017 1134 Pathology      Findings: hemorrhoids   Complications: none  Implants: * No implants in log * Per  Johnathan, patient stated that he was HI and SI before coming to the ED and when he was brought to intake room. Writer informed TL and security of patient.

## 2023-09-07 ENCOUNTER — HOSPITAL ENCOUNTER (OUTPATIENT)
Facility: HOSPITAL | Age: 43
Discharge: HOME | End: 2023-09-07
Attending: INTERNAL MEDICINE
Payer: MEDICAID

## 2023-09-07 DIAGNOSIS — D86.9 SARCOIDOSIS: ICD-10-CM

## 2023-09-07 PROCEDURE — 71250 CT THORAX DX C-: CPT

## 2023-09-15 RX ORDER — SODIUM CHLORIDE, SODIUM LACTATE, POTASSIUM CHLORIDE, CALCIUM CHLORIDE 600; 310; 30; 20 MG/100ML; MG/100ML; MG/100ML; MG/100ML
INJECTION, SOLUTION INTRAVENOUS CONTINUOUS
Status: CANCELLED | OUTPATIENT
Start: 2023-09-15

## 2023-09-15 RX ORDER — SODIUM CHLORIDE 9 MG/ML
INJECTION, SOLUTION INTRAVENOUS CONTINUOUS
Status: CANCELLED | OUTPATIENT
Start: 2023-09-15

## 2023-09-15 RX ORDER — HYDROXYZINE HYDROCHLORIDE 25 MG/ML
25 INJECTION, SOLUTION INTRAMUSCULAR ONCE
Status: CANCELLED | OUTPATIENT
Start: 2023-09-15

## 2023-09-15 NOTE — PRE-PROCEDURE INSTRUCTIONS
PAT completed with the patient. Patient to arrive at 0730 for her bronchoscopy on 9/18. Orders placed.

## 2023-09-18 ENCOUNTER — APPOINTMENT (OUTPATIENT)
Facility: HOSPITAL | Age: 43
End: 2023-09-18
Attending: INTERNAL MEDICINE
Payer: MEDICAID

## 2023-09-18 ENCOUNTER — HOSPITAL ENCOUNTER (OUTPATIENT)
Facility: HOSPITAL | Age: 43
Setting detail: OUTPATIENT SURGERY
Discharge: HOME | End: 2023-09-18
Attending: INTERNAL MEDICINE | Admitting: INTERNAL MEDICINE
Payer: MEDICAID

## 2023-09-18 VITALS
BODY MASS INDEX: 32.06 KG/M2 | WEIGHT: 229 LBS | HEART RATE: 79 BPM | HEIGHT: 71 IN | SYSTOLIC BLOOD PRESSURE: 128 MMHG | DIASTOLIC BLOOD PRESSURE: 74 MMHG | TEMPERATURE: 97.9 F | OXYGEN SATURATION: 98 % | RESPIRATION RATE: 18 BRPM

## 2023-09-18 DIAGNOSIS — D86.9 SARCOIDOSIS: ICD-10-CM

## 2023-09-18 PROCEDURE — 87070 CULTURE OTHR SPECIMN AEROBIC: CPT

## 2023-09-18 PROCEDURE — 6370000000 HC RX 637 (ALT 250 FOR IP): Performed by: INTERNAL MEDICINE

## 2023-09-18 PROCEDURE — 99152 MOD SED SAME PHYS/QHP 5/>YRS: CPT | Performed by: INTERNAL MEDICINE

## 2023-09-18 PROCEDURE — 99153 MOD SED SAME PHYS/QHP EA: CPT | Performed by: INTERNAL MEDICINE

## 2023-09-18 PROCEDURE — 71045 X-RAY EXAM CHEST 1 VIEW: CPT

## 2023-09-18 PROCEDURE — 3600007502: Performed by: INTERNAL MEDICINE

## 2023-09-18 PROCEDURE — 88305 TISSUE EXAM BY PATHOLOGIST: CPT

## 2023-09-18 PROCEDURE — 2709999900 HC NON-CHARGEABLE SUPPLY: Performed by: INTERNAL MEDICINE

## 2023-09-18 PROCEDURE — 2500000003 HC RX 250 WO HCPCS: Performed by: INTERNAL MEDICINE

## 2023-09-18 PROCEDURE — 87205 SMEAR GRAM STAIN: CPT

## 2023-09-18 PROCEDURE — 3600007512: Performed by: INTERNAL MEDICINE

## 2023-09-18 PROCEDURE — 88112 CYTOPATH CELL ENHANCE TECH: CPT

## 2023-09-18 PROCEDURE — 7100000010 HC PHASE II RECOVERY - FIRST 15 MIN: Performed by: INTERNAL MEDICINE

## 2023-09-18 PROCEDURE — 7100000011 HC PHASE II RECOVERY - ADDTL 15 MIN: Performed by: INTERNAL MEDICINE

## 2023-09-18 PROCEDURE — 6360000002 HC RX W HCPCS: Performed by: INTERNAL MEDICINE

## 2023-09-18 RX ORDER — LIDOCAINE HYDROCHLORIDE 20 MG/ML
SOLUTION OROPHARYNGEAL PRN
Status: DISCONTINUED | OUTPATIENT
Start: 2023-09-18 | End: 2023-09-18 | Stop reason: ALTCHOICE

## 2023-09-18 RX ORDER — DIPHENHYDRAMINE HYDROCHLORIDE 50 MG/ML
INJECTION INTRAMUSCULAR; INTRAVENOUS PRN
Status: DISCONTINUED | OUTPATIENT
Start: 2023-09-18 | End: 2023-09-18 | Stop reason: ALTCHOICE

## 2023-09-18 RX ORDER — TRIAMTERENE AND HYDROCHLOROTHIAZIDE 37.5; 25 MG/1; MG/1
1 TABLET ORAL DAILY
COMMUNITY

## 2023-09-18 RX ORDER — MIDAZOLAM HYDROCHLORIDE 1 MG/ML
INJECTION INTRAMUSCULAR; INTRAVENOUS PRN
Status: DISCONTINUED | OUTPATIENT
Start: 2023-09-18 | End: 2023-09-18 | Stop reason: ALTCHOICE

## 2023-09-18 RX ORDER — FENTANYL CITRATE 50 UG/ML
INJECTION, SOLUTION INTRAMUSCULAR; INTRAVENOUS PRN
Status: DISCONTINUED | OUTPATIENT
Start: 2023-09-18 | End: 2023-09-18 | Stop reason: ALTCHOICE

## 2023-09-18 RX ORDER — BUDESONIDE, GLYCOPYRROLATE, AND FORMOTEROL FUMARATE 160; 9; 4.8 UG/1; UG/1; UG/1
2 AEROSOL, METERED RESPIRATORY (INHALATION) DAILY
COMMUNITY

## 2023-09-18 RX ORDER — LIDOCAINE HYDROCHLORIDE 20 MG/ML
INJECTION, SOLUTION EPIDURAL; INFILTRATION; INTRACAUDAL; PERINEURAL PRN
Status: DISCONTINUED | OUTPATIENT
Start: 2023-09-18 | End: 2023-09-18 | Stop reason: ALTCHOICE

## 2023-09-18 ASSESSMENT — PAIN - FUNCTIONAL ASSESSMENT: PAIN_FUNCTIONAL_ASSESSMENT: NONE - DENIES PAIN

## 2023-09-18 ASSESSMENT — PAIN SCALES - GENERAL
PAINLEVEL_OUTOF10: 0
PAINLEVEL_OUTOF10: 0

## 2023-09-18 NOTE — PROCEDURES
masses or lesions. Additional topical anesthesia with 2% Xylocaine was applied to the larynx and subsequently throughout the tracheobronchial tree for a total of 18 cc. The bronchoscope was then advanced through the larynx into the trachea. The trachea showed mild evidence of erythema and moderate amounts of clear frothy secretions. These were suctioned clear. The bronchoscope was then advanced through the blake, which was sharp. Then advanced into the left main stem and each segment, subsegement in the left upper lingula and lower lobe was visualized. There was mild tracheobronchitis with mild friability throughout. There was modest amounts of white secretion. There were no other findings including evidence of mass, anatomic distortions, or hemorrhage. The bronchoscope was subsequently withdrawn and advanced into the right mainstem. Again, each segment and subsegment was well visualized left upper lobe Cytobrush was also done  The right upper lobe no specific masses or other lesions were identified throughout the tracheobronchial tree on the right. There was mild tracheal bronchitis with friability. Upon coughing, there was punctate hemorrhage. The bronchoscope was then advanced through the bronchus intermedius and the right lower lobe multiple transbronchial biopsies was done the bronchoscope was then wedged in the right upper lobe and washing was done and also Cytobrush was also done from the right upper lobe the bronchoscope was withdrawn and the area was suctioned clear. The bronchoscope was then advanced into the apical segment of the right upper lobe and the bronchioalveolar lavage again performed. Samples were taken and the bronchoscope was removed suctioned the area clear. The bronchoscope was then re-advanced into the right lower lobe and multiple transbronchial biopsies were taken posterior and lateral segments of the right lower lobe.  Minimal hemorrhage was identified and suctioned clear without

## 2023-09-19 LAB
CYTOLOGY-NON GYN: NORMAL
CYTOLOGY-NON GYN: NORMAL

## 2023-09-20 LAB
BACTERIA SPEC CULT: NORMAL
GRAM STN SPEC: NORMAL
Lab: NORMAL

## 2024-03-18 NOTE — MR AVS SNAPSHOT
Daily Speech Treatment Note    Today's date: 3/18/2024  Patient’s name: Paula Lubin  : 1944  MRN: [id number]  Safety measures: HTN, memory  Referring provider: Liv Mc MD    No diagnosis found.      Visit tracking:  POC expires Unit limit Auth Expiration date PT/OT + Visit Limit?   24 N/a 24 8   3/28/24  6/24/24 8   24 N/a TBD 8               Visit/Unit Tracking  AUTH Status:  Date   IE   RE 12/19 12/28 1/17 1/30 2/15  PU   RE 2/29 3/4  Needs auth    After 8 visits Used 1 2 3 4 5 6 5 1 2 3 4 5 6 7 8     Remaining  7 6 5 4 3 2 1 7 6 5 4 3 2 1 0        Subjective/Behavioral:  -Patient brought memory journal for today's session.    Objective/Assessment:    Short-term goals:    Patient and caregiver will be educated on the use of internal and external memory aids/compensatory strategies to facilitate increased recall of routine, daily events, names, orientation to time, etc. (to be achieved in 1-2 weeks). -PARTIALLY MET/ONGOING         Patient will complete auditory immediate and short-term memory tasks with 80% accuracy to facilitate increased ability to retell narratives and recall information within functional living environment (to be achieved in 4-6 weeks). -PARTIALLY MET/ONGOING           Patient will complete complex auditory attention processing tasks (e.g., sentence unscrambles, ranking numbers/words, etc.) with 80% accuracy to facilitate increased processing, storage, and retrieval of functional information (to be achieved in 4-6 weeks). -PARTIALLY MET/ONGOING      Patient will be educated on word finding strategies (i.e., circumlocution) for improved generative naming and verbal expression skills (to be achieved in 1-2 weeks). -PARTIALLY MET/ONGOING     Patient will complete word generation tasks (e.g., verbal fluency, categorization, analogies, synonyms/antonyms, , etc.) with 80% accuracy using word finding strategies to facilitate  Visit Information Date & Time Provider Department Dept. Phone Encounter #  
 1/23/2017 11:00 AM Jennifer Levi MD [address] [id number] Upcoming Health Maintenance Date Due Pneumococcal 19-64 Medium Risk (1 of 1 - PPSV23) 3/23/1999 DTaP/Tdap/Td series (1 - Tdap) 3/23/2001 PAP AKA CERVICAL CYTOLOGY 3/31/2015 BREAST CANCER SCRN MAMMOGRAM 8/20/2015 INFLUENZA AGE 9 TO ADULT 8/1/2016 Allergies as of 1/23/2017  Review Complete On: 1/23/2017 By: Jennifer Levi MD  
 No Known Allergies Current Immunizations  Never Reviewed No immunizations on file. Not reviewed this visit You Were Diagnosed With   
  
 Codes Comments Essential hypertension    -  Primary ICD-10-CM: I10 
ICD-9-CM: 401.9 Chronic left shoulder pain     ICD-10-CM: M25.512, G89.29 ICD-9-CM: 719.41, 338.29 Leg swelling     ICD-10-CM: M79.89 ICD-9-CM: 729.81 Vitals BP Pulse Temp Resp Height(growth percentile) Weight(growth percentile) 121/75 (BP 1 Location: Left arm, BP Patient Position: Sitting) 76 97.1 °F (36.2 °C) (Oral) 20 5' 11\" (1.803 m) 224 lb (101.6 kg) SpO2 BMI OB Status Smoking Status 99% 31.24 kg/m2 IUD Current Every Day Smoker BMI and BSA Data Body Mass Index Body Surface Area  
 31.24 kg/m 2 2.26 m 2 Preferred Pharmacy Pharmacy Name Phone [facility] [address], [address] [facility] Your Updated Medication List  
  
   
This list is accurate as of: 1/23/17  3:06 PM.  Always use your most recent med list.  
  
  
  
  
 chlorthalidone 25 mg tablet Commonly known as:  Racheal  Take 1 Tab by mouth daily. eletriptan 40 mg tablet Commonly known as:  RELPAX Take 1 Tab by mouth once as needed for 1 dose.  may repeat in 2 hours if necessary  
  
 hydrocortisone 2.5 % rectal cream  
Commonly known as:  PROCTOCREAM-HC  
 Insert  into rectum four (4) times daily. multivitamin tablet Commonly known as:  ONE A DAY Take 1 Tab by mouth daily. naproxen 500 mg tablet Commonly known as:  NAPROSYN Take 1 Tab by mouth two (2) times daily (with meals). traMADol 50 mg tablet Commonly known as:  ULTRAM  
Take 1 Tab by mouth every six (6) hours as needed for Pain. Max Daily Amount: 200 mg. Prescriptions Sent to Pharmacy Refills  
 chlorthalidone (HYGROTEN) 25 mg tablet 5 Sig: Take 1 Tab by mouth daily. Class: Normal  
 Pharmacy: [facility] [address] [facility] Ph #: [phone] Route: Oral  
 naproxen (NAPROSYN) 500 mg tablet 1 Sig: Take 1 Tab by mouth two (2) times daily (with meals). Class: Normal  
 Pharmacy: [facility] [address] [facility] Ph #: [phone] Route: Oral  
  
To-Do List   
 01/23/2017 Imaging:  XR SHOULDER LT AP/LAT MIN 2 V Introducing [facility] & HEALTH SERVICES! Dear Kayleigh Nguyen: Thank you for requesting a Namshi account. Our records indicate that you already have an active Namshi account. You can access your account anytime at [email or web address] Did you know that you can access your hospital and ER discharge instructions at any time in Namshi? You can also review all of your test results from your hospital stay or ER visit. Additional Information If you have questions, please visit the Frequently Asked Questions section of the Namshi website at [email or web address] Remember, Namshi is NOT to be used for urgent needs. For medical emergencies, dial 911. Now available from your iPhone and Android! Please provide this summary of care documentation to your next provider. Your primary care clinician is listed as Oleg Villalobos. If you have any questions after today's visit, please call [phone]. improved word retrieval skills (to be achieved in 4-6 weeks). -PARTIALLY MET/ONGOING        Plan:  -Patient was provided with home exercises/activities to target goals in plan of care at the end of today's session.  -Continue with current plan of care.

## 2024-08-19 ENCOUNTER — HOSPITAL ENCOUNTER (OUTPATIENT)
Facility: HOSPITAL | Age: 44
Discharge: HOME | End: 2024-08-22
Attending: INTERNAL MEDICINE
Payer: MEDICAID

## 2024-08-19 DIAGNOSIS — D86.9 SARCOIDOSIS: ICD-10-CM

## 2024-08-19 PROCEDURE — 71250 CT THORAX DX C-: CPT

## 2024-09-20 PROBLEM — Z11.4 SCREENING FOR HIV (HUMAN IMMUNODEFICIENCY VIRUS): Status: ACTIVE | Noted: 2024-09-20

## 2024-09-20 PROBLEM — R59.0 MEDIASTINAL LYMPHADENOPATHY: Status: ACTIVE | Noted: 2024-09-20

## 2024-09-20 PROBLEM — Z12.31 ENCOUNTER FOR SCREENING MAMMOGRAM FOR MALIGNANT NEOPLASM OF BREAST: Status: ACTIVE | Noted: 2024-09-20

## 2024-09-20 PROBLEM — Z86.59 HISTORY OF DEPRESSION: Status: ACTIVE | Noted: 2024-09-20

## 2024-09-20 PROBLEM — Z13.220 SCREENING FOR LIPID DISORDERS: Status: ACTIVE | Noted: 2024-09-20

## 2024-09-20 PROBLEM — Z86.2 HISTORY OF SARCOIDOSIS: Status: ACTIVE | Noted: 2024-09-20

## 2024-09-20 PROBLEM — Z12.4 SCREENING FOR CERVICAL CANCER: Status: ACTIVE | Noted: 2024-09-20

## 2024-09-20 PROBLEM — Z11.59 NEED FOR HEPATITIS C SCREENING TEST: Status: ACTIVE | Noted: 2024-09-20

## 2024-09-20 PROBLEM — E55.9 VITAMIN D DEFICIENCY: Status: ACTIVE | Noted: 2024-09-20

## 2024-09-25 ENCOUNTER — OFFICE VISIT (OUTPATIENT)
Facility: CLINIC | Age: 44
End: 2024-09-25
Payer: MEDICAID

## 2024-09-25 VITALS
DIASTOLIC BLOOD PRESSURE: 88 MMHG | HEART RATE: 72 BPM | SYSTOLIC BLOOD PRESSURE: 140 MMHG | RESPIRATION RATE: 18 BRPM | HEIGHT: 71 IN | TEMPERATURE: 98 F | BODY MASS INDEX: 33.6 KG/M2 | OXYGEN SATURATION: 98 % | WEIGHT: 240 LBS

## 2024-09-25 DIAGNOSIS — Z83.3 FAMILY HISTORY OF DIABETES MELLITUS: ICD-10-CM

## 2024-09-25 DIAGNOSIS — I10 PRIMARY HYPERTENSION: Primary | ICD-10-CM

## 2024-09-25 DIAGNOSIS — E55.9 VITAMIN D DEFICIENCY: ICD-10-CM

## 2024-09-25 DIAGNOSIS — Z11.59 NEED FOR HEPATITIS C SCREENING TEST: ICD-10-CM

## 2024-09-25 DIAGNOSIS — Z11.4 SCREENING FOR HIV (HUMAN IMMUNODEFICIENCY VIRUS): ICD-10-CM

## 2024-09-25 DIAGNOSIS — Z13.220 SCREENING FOR LIPID DISORDERS: ICD-10-CM

## 2024-09-25 DIAGNOSIS — Z82.49 FAMILY HISTORY OF HYPERTENSION: ICD-10-CM

## 2024-09-25 DIAGNOSIS — Z12.4 SCREENING FOR CERVICAL CANCER: ICD-10-CM

## 2024-09-25 DIAGNOSIS — I10 PRIMARY HYPERTENSION: ICD-10-CM

## 2024-09-25 DIAGNOSIS — Z12.31 ENCOUNTER FOR SCREENING MAMMOGRAM FOR MALIGNANT NEOPLASM OF BREAST: ICD-10-CM

## 2024-09-25 DIAGNOSIS — R59.0 MEDIASTINAL LYMPHADENOPATHY: ICD-10-CM

## 2024-09-25 DIAGNOSIS — Z80.3 FAMILY HISTORY OF BREAST CANCER IN SISTER: ICD-10-CM

## 2024-09-25 DIAGNOSIS — D86.9 SARCOIDOSIS: ICD-10-CM

## 2024-09-25 PROCEDURE — 99204 OFFICE O/P NEW MOD 45 MIN: CPT | Performed by: INTERNAL MEDICINE

## 2024-09-25 PROCEDURE — 3079F DIAST BP 80-89 MM HG: CPT | Performed by: INTERNAL MEDICINE

## 2024-09-25 PROCEDURE — 3077F SYST BP >= 140 MM HG: CPT | Performed by: INTERNAL MEDICINE

## 2024-09-25 RX ORDER — FUROSEMIDE 20 MG
20 TABLET ORAL DAILY
COMMUNITY

## 2024-09-25 RX ORDER — CARVEDILOL 3.12 MG/1
3.12 TABLET ORAL 2 TIMES DAILY WITH MEALS
COMMUNITY

## 2024-09-25 RX ORDER — HYDRALAZINE HYDROCHLORIDE 50 MG/1
50 TABLET, FILM COATED ORAL 2 TIMES DAILY
COMMUNITY

## 2024-09-25 SDOH — ECONOMIC STABILITY: FOOD INSECURITY: WITHIN THE PAST 12 MONTHS, YOU WORRIED THAT YOUR FOOD WOULD RUN OUT BEFORE YOU GOT MONEY TO BUY MORE.: NEVER TRUE

## 2024-09-25 SDOH — ECONOMIC STABILITY: FOOD INSECURITY: WITHIN THE PAST 12 MONTHS, THE FOOD YOU BOUGHT JUST DIDN'T LAST AND YOU DIDN'T HAVE MONEY TO GET MORE.: NEVER TRUE

## 2024-09-25 SDOH — ECONOMIC STABILITY: INCOME INSECURITY: HOW HARD IS IT FOR YOU TO PAY FOR THE VERY BASICS LIKE FOOD, HOUSING, MEDICAL CARE, AND HEATING?: NOT HARD AT ALL

## 2024-09-25 ASSESSMENT — PATIENT HEALTH QUESTIONNAIRE - PHQ9
2. FEELING DOWN, DEPRESSED OR HOPELESS: NOT AT ALL
SUM OF ALL RESPONSES TO PHQ QUESTIONS 1-9: 0
SUM OF ALL RESPONSES TO PHQ QUESTIONS 1-9: 0
SUM OF ALL RESPONSES TO PHQ9 QUESTIONS 1 & 2: 0
1. LITTLE INTEREST OR PLEASURE IN DOING THINGS: NOT AT ALL
SUM OF ALL RESPONSES TO PHQ QUESTIONS 1-9: 0
SUM OF ALL RESPONSES TO PHQ QUESTIONS 1-9: 0

## 2024-09-25 ASSESSMENT — ENCOUNTER SYMPTOMS
ALLERGIC/IMMUNOLOGIC NEGATIVE: 1
RESPIRATORY NEGATIVE: 1
GASTROINTESTINAL NEGATIVE: 1

## 2024-10-20 PROBLEM — Z13.220 SCREENING FOR LIPID DISORDERS: Status: RESOLVED | Noted: 2024-09-20 | Resolved: 2024-10-20

## 2024-10-20 PROBLEM — Z12.4 SCREENING FOR CERVICAL CANCER: Status: RESOLVED | Noted: 2024-09-20 | Resolved: 2024-10-20

## 2024-10-20 PROBLEM — Z12.31 ENCOUNTER FOR SCREENING MAMMOGRAM FOR MALIGNANT NEOPLASM OF BREAST: Status: RESOLVED | Noted: 2024-09-20 | Resolved: 2024-10-20

## 2024-10-20 PROBLEM — Z11.4 SCREENING FOR HIV (HUMAN IMMUNODEFICIENCY VIRUS): Status: RESOLVED | Noted: 2024-09-20 | Resolved: 2024-10-20

## 2024-10-20 PROBLEM — Z11.59 NEED FOR HEPATITIS C SCREENING TEST: Status: RESOLVED | Noted: 2024-09-20 | Resolved: 2024-10-20

## 2025-04-08 ENCOUNTER — PATIENT MESSAGE (OUTPATIENT)
Facility: CLINIC | Age: 45
End: 2025-04-08

## 2025-05-17 PROBLEM — Z13.1 SCREENING FOR DIABETES MELLITUS: Status: ACTIVE | Noted: 2025-05-17

## 2025-05-17 PROBLEM — Z13.220 SCREENING FOR LIPID DISORDERS: Status: ACTIVE | Noted: 2025-05-17

## 2025-05-17 PROBLEM — Z12.11 SCREEN FOR COLON CANCER: Status: ACTIVE | Noted: 2025-05-17

## 2025-05-17 PROBLEM — Z12.4 SCREENING FOR CERVICAL CANCER: Status: ACTIVE | Noted: 2025-05-17

## 2025-05-17 PROBLEM — Z12.31 ENCOUNTER FOR SCREENING MAMMOGRAM FOR MALIGNANT NEOPLASM OF BREAST: Status: ACTIVE | Noted: 2025-05-17

## 2025-05-17 PROBLEM — Z11.59 NEED FOR HEPATITIS C SCREENING TEST: Status: ACTIVE | Noted: 2025-05-17

## 2025-05-17 PROBLEM — Z11.4 SCREENING FOR HIV (HUMAN IMMUNODEFICIENCY VIRUS): Status: ACTIVE | Noted: 2025-05-17

## 2025-05-22 ENCOUNTER — OFFICE VISIT (OUTPATIENT)
Facility: CLINIC | Age: 45
End: 2025-05-22

## 2025-05-22 VITALS
DIASTOLIC BLOOD PRESSURE: 82 MMHG | BODY MASS INDEX: 37.07 KG/M2 | WEIGHT: 264.8 LBS | HEIGHT: 71 IN | RESPIRATION RATE: 18 BRPM | SYSTOLIC BLOOD PRESSURE: 140 MMHG | OXYGEN SATURATION: 98 % | TEMPERATURE: 98 F | HEART RATE: 80 BPM

## 2025-05-22 DIAGNOSIS — Z13.220 SCREENING FOR LIPID DISORDERS: ICD-10-CM

## 2025-05-22 DIAGNOSIS — I10 PRIMARY HYPERTENSION: ICD-10-CM

## 2025-05-22 DIAGNOSIS — Z11.4 SCREENING FOR HIV (HUMAN IMMUNODEFICIENCY VIRUS): ICD-10-CM

## 2025-05-22 DIAGNOSIS — Z12.11 SCREEN FOR COLON CANCER: ICD-10-CM

## 2025-05-22 DIAGNOSIS — Z11.59 NEED FOR HEPATITIS C SCREENING TEST: ICD-10-CM

## 2025-05-22 DIAGNOSIS — R00.2 PALPITATION: ICD-10-CM

## 2025-05-22 DIAGNOSIS — R79.89 ABNORMAL THYROID BLOOD TEST: ICD-10-CM

## 2025-05-22 DIAGNOSIS — Z12.31 ENCOUNTER FOR SCREENING MAMMOGRAM FOR MALIGNANT NEOPLASM OF BREAST: ICD-10-CM

## 2025-05-22 DIAGNOSIS — Z86.2 HISTORY OF SARCOIDOSIS: ICD-10-CM

## 2025-05-22 DIAGNOSIS — Z86.59 HISTORY OF DEPRESSION: ICD-10-CM

## 2025-05-22 DIAGNOSIS — Z80.3 FAMILY HISTORY OF BREAST CANCER IN SISTER: ICD-10-CM

## 2025-05-22 DIAGNOSIS — E55.9 VITAMIN D DEFICIENCY: ICD-10-CM

## 2025-05-22 DIAGNOSIS — Z12.4 SCREENING FOR CERVICAL CANCER: ICD-10-CM

## 2025-05-22 DIAGNOSIS — Z13.1 SCREENING FOR DIABETES MELLITUS: ICD-10-CM

## 2025-05-22 DIAGNOSIS — Z83.3 FAMILY HISTORY OF DIABETES MELLITUS: ICD-10-CM

## 2025-05-22 DIAGNOSIS — F41.9 ANXIETY: ICD-10-CM

## 2025-05-22 RX ORDER — ESCITALOPRAM OXALATE 5 MG/1
5 TABLET ORAL DAILY
Qty: 30 TABLET | Refills: 3 | Status: SHIPPED | OUTPATIENT
Start: 2025-05-22

## 2025-05-22 RX ORDER — HYDROXYZINE HYDROCHLORIDE 25 MG/1
12.5 TABLET, FILM COATED ORAL
Qty: 30 TABLET | Refills: 1 | Status: SHIPPED | OUTPATIENT
Start: 2025-05-22

## 2025-05-22 RX ORDER — AMLODIPINE BESYLATE 5 MG/1
5 TABLET ORAL DAILY
COMMUNITY
Start: 2025-02-26

## 2025-05-22 RX ORDER — CARVEDILOL 6.25 MG/1
6.25 TABLET ORAL 2 TIMES DAILY
Qty: 60 TABLET | Refills: 3 | Status: SHIPPED | OUTPATIENT
Start: 2025-05-22

## 2025-05-22 SDOH — ECONOMIC STABILITY: FOOD INSECURITY: WITHIN THE PAST 12 MONTHS, THE FOOD YOU BOUGHT JUST DIDN'T LAST AND YOU DIDN'T HAVE MONEY TO GET MORE.: NEVER TRUE

## 2025-05-22 SDOH — ECONOMIC STABILITY: FOOD INSECURITY: WITHIN THE PAST 12 MONTHS, YOU WORRIED THAT YOUR FOOD WOULD RUN OUT BEFORE YOU GOT MONEY TO BUY MORE.: NEVER TRUE

## 2025-05-22 ASSESSMENT — PATIENT HEALTH QUESTIONNAIRE - PHQ9
SUM OF ALL RESPONSES TO PHQ QUESTIONS 1-9: 0
2. FEELING DOWN, DEPRESSED OR HOPELESS: NOT AT ALL
1. LITTLE INTEREST OR PLEASURE IN DOING THINGS: NOT AT ALL
SUM OF ALL RESPONSES TO PHQ QUESTIONS 1-9: 0

## 2025-05-22 NOTE — PROGRESS NOTES
Chief Complaint   Patient presents with    Follow-up Chronic Condition     Patient stated she is hypothyroidism bump on her forehead she wanted you to know about     /80 (BP Site: Right Upper Arm, Patient Position: Sitting)   Pulse 82   Temp 98 °F (36.7 °C) (Oral)   Resp 18   Ht 1.803 m (5' 10.98\")   Wt 120.1 kg (264 lb 12.8 oz)   LMP 04/14/2025   SpO2 98%   BMI 36.95 kg/m²           \"Have you been to the ER, urgent care clinic since your last visit?  Hospitalized since your last visit?\"    [facility] Palpitations April 2024    “Have you seen or consulted any other health care providers outside our system since your last visit?”    Yes Dr Ramirez    Have you had a mammogram?”   NO    Date of last Mammogram: 11/27/2017      “Have you had a pap smear?”    NO    Date of last Cervical Cancer screen (HPV or PAP): 11/6/2017       “Have you had a colorectal cancer screening such as a colonoscopy/FIT/Cologuard?    NO    No colonoscopy on file  No cologuard on file  No FIT/FOBT on file   No flexible sigmoidoscopy on file

## 2025-05-22 NOTE — PROGRESS NOTES
Cristobal Orosco (: 1980) is a 45 y.o. female, Established patient patient, here for evaluation of the following chief complaint(s):  Follow-up Chronic Condition (Patient stated she is hypothyroidism bump on her forehead she wanted you to know about)         ASSESSMENT/PLAN:  Below is the assessment and plan developed based on review of pertinent history, physical exam, labs, studies, and medications.      1. Palpitation  -     TSH + Free T4 Panel; Future  -     [facility]  2. Primary hypertension  -     CBC with Auto Differential; Future  -     Comprehensive Metabolic Panel; Future  3. Abnormal thyroid blood test  -     TSH + Free T4 Panel; Future  4. Anxiety  5. History of sarcoidosis  6. History of depression  7. Family history of breast cancer in sister  8. Family history of diabetes mellitus  9. Need for hepatitis C screening test  -     HIV 1/2 Ag/Ab, 4TH Generation,W Rflx Confirm; Future  10. Encounter for screening mammogram for malignant neoplasm of breast  11. Screening for HIV (human immunodeficiency virus)  -     Lipid Panel; Future  12. Screening for diabetes mellitus  -     Hepatitis C Antibody; Future  13. Screening for cervical cancer  -     Hemoglobin A1C; Future  14. Screen for colon cancer  15. Screening for lipid disorders  -     Lipid Panel; Future  16. Vitamin D deficiency  -     Vitamin D 25 Hydroxy; Future    Hypertension with normal adult size cuff blood pressure is running on the upper side of normal range so I increase carvedilol 6.25 mg twice a day and continue current dose of amlodipine hydralazine andFurosemide      Palpitation different etiologies explained etiology is not clear to me more favoring towards anxiety however she had detailed cardiac workup done for palpitation in the past with cardiologist Dr. Stephenson so I told her to call him and to let him know and she might need Holter monitoring for more than 3 days.  She should discuss

## 2025-05-23 ASSESSMENT — ENCOUNTER SYMPTOMS
ALLERGIC/IMMUNOLOGIC NEGATIVE: 1
RESPIRATORY NEGATIVE: 1
GASTROINTESTINAL NEGATIVE: 1

## 2025-08-20 LAB
25(OH)D3+25(OH)D2 SERPL-MCNC: 18.4 NG/ML (ref 30–100)
ALBUMIN SERPL-MCNC: 4.5 G/DL (ref 3.9–4.9)
ALP SERPL-CCNC: 55 IU/L (ref 44–121)
ALT SERPL-CCNC: 17 IU/L (ref 0–32)
AST SERPL-CCNC: 20 IU/L (ref 0–40)
BASOPHILS # BLD AUTO: 0 X10E3/UL (ref 0–0.2)
BASOPHILS NFR BLD AUTO: 1 %
BILIRUB SERPL-MCNC: 1 MG/DL (ref 0–1.2)
BUN SERPL-MCNC: 8 MG/DL (ref 6–24)
BUN/CREAT SERPL: 11 (ref 9–23)
CALCIUM SERPL-MCNC: 9.5 MG/DL (ref 8.7–10.2)
CHLORIDE SERPL-SCNC: 104 MMOL/L (ref 96–106)
CHOLEST SERPL-MCNC: 178 MG/DL (ref 100–199)
CO2 SERPL-SCNC: 21 MMOL/L (ref 20–29)
CREAT SERPL-MCNC: 0.74 MG/DL (ref 0.57–1)
EGFRCR SERPLBLD CKD-EPI 2021: 102 ML/MIN/1.73
EOSINOPHIL # BLD AUTO: 0.1 X10E3/UL (ref 0–0.4)
EOSINOPHIL NFR BLD AUTO: 3 %
ERYTHROCYTE [DISTWIDTH] IN BLOOD BY AUTOMATED COUNT: 12.2 % (ref 11.7–15.4)
GLOBULIN SER CALC-MCNC: 2.4 G/DL (ref 1.5–4.5)
GLUCOSE SERPL-MCNC: 80 MG/DL (ref 70–99)
HBA1C MFR BLD: 5.3 % (ref 4.8–5.6)
HCT VFR BLD AUTO: 42.9 % (ref 34–46.6)
HCV IGG SERPL QL IA: NON REACTIVE
HDLC SERPL-MCNC: 53 MG/DL
HGB BLD-MCNC: 13.5 G/DL (ref 11.1–15.9)
HIV 1+2 AB+HIV1 P24 AG SERPL QL IA: NON REACTIVE
IMM GRANULOCYTES # BLD AUTO: 0 X10E3/UL (ref 0–0.1)
IMM GRANULOCYTES NFR BLD AUTO: 0 %
LDLC SERPL CALC-MCNC: 111 MG/DL (ref 0–99)
LYMPHOCYTES # BLD AUTO: 1.3 X10E3/UL (ref 0.7–3.1)
LYMPHOCYTES NFR BLD AUTO: 32 %
MCH RBC QN AUTO: 28 PG (ref 26.6–33)
MCHC RBC AUTO-ENTMCNC: 31.5 G/DL (ref 31.5–35.7)
MCV RBC AUTO: 89 FL (ref 79–97)
MONOCYTES # BLD AUTO: 0.4 X10E3/UL (ref 0.1–0.9)
MONOCYTES NFR BLD AUTO: 10 %
NEUTROPHILS # BLD AUTO: 2.3 X10E3/UL (ref 1.4–7)
NEUTROPHILS NFR BLD AUTO: 54 %
PLATELET # BLD AUTO: 139 X10E3/UL (ref 150–450)
POTASSIUM SERPL-SCNC: 4 MMOL/L (ref 3.5–5.2)
PROT SERPL-MCNC: 6.9 G/DL (ref 6–8.5)
RBC # BLD AUTO: 4.82 X10E6/UL (ref 3.77–5.28)
SODIUM SERPL-SCNC: 139 MMOL/L (ref 134–144)
T4 FREE SERPL-MCNC: 0.96 NG/DL (ref 0.82–1.77)
TRIGL SERPL-MCNC: 76 MG/DL (ref 0–149)
TSH SERPL DL<=0.005 MIU/L-ACNC: 1.26 UIU/ML (ref 0.45–4.5)
VLDLC SERPL CALC-MCNC: 14 MG/DL (ref 5–40)
WBC # BLD AUTO: 4.2 X10E3/UL (ref 3.4–10.8)

## (undated) DEVICE — SOLUTION IV 1000ML 0.9% SOD CHL

## (undated) DEVICE — (D)SYR 10ML 1/5ML GRAD NSAF -- PKGING CHANGE USE ITEM 338027

## (undated) DEVICE — NEEDLE HYPO 22GA L1.5IN BLK S STL HUB POLYPR SHLD REG BVL

## (undated) DEVICE — PREP SKN POV IOD 10% SOL 4OZ --

## (undated) DEVICE — KIT CLN UP BON SECOURS MARYV

## (undated) DEVICE — 3M™ MEDIPORE™ H SOFT CLOTH SURGICAL TAPE, 2863, 3 IN X 10 YD, 12/CASE: Brand: 3M™ MEDIPORE™

## (undated) DEVICE — GAUZE SPONGES,16 PLY: Brand: CURITY

## (undated) DEVICE — STERILE POLYISOPRENE POWDER-FREE SURGICAL GLOVES: Brand: PROTEXIS

## (undated) DEVICE — KENDALL SCD EXPRESS SLEEVES, KNEE LENGTH, MEDIUM: Brand: KENDALL SCD

## (undated) DEVICE — FLEX ADVANTAGE 3000CC: Brand: FLEX ADVANTAGE

## (undated) DEVICE — NEEDLE ELECTRODE: Brand: EDGE

## (undated) DEVICE — ABDOMINAL PAD: Brand: DERMACEA

## (undated) DEVICE — BOWL MED M 16OZ PLAS CAP GRAD

## (undated) DEVICE — PILLOW POS AD L7IN R FOAM HD REST INTUB SLOT DISP

## (undated) DEVICE — SUTURE VCRL SZ 2-0 L27IN ABSRB UD L26MM SH 1/2 CIR J417H

## (undated) DEVICE — INTENDED FOR TISSUE SEPARATION, AND OTHER PROCEDURES THAT REQUIRE A SHARP SURGICAL BLADE TO PUNCTURE OR CUT.: Brand: BARD-PARKER ® STAINLESS STEEL BLADES

## (undated) DEVICE — INTENDED FOR TISSUE SEPARATION, AND OTHER PROCEDURES THAT REQUIRE A SHARP SURGICAL BLADE TO PUNCTURE OR CUT.: Brand: BARD-PARKER SAFETY BLADES SIZE 15, STERILE

## (undated) DEVICE — SYRINGE 20ML E/T: Brand: SYRINGE 20ML E/T

## (undated) DEVICE — SUTURE VCRL SZ 3-0 L27IN ABSRB UD L26MM SH 1/2 CIR J416H

## (undated) DEVICE — PACK PROCEDURE SURG MAJ W/ BASIN LF

## (undated) DEVICE — SYRINGE MED 10CC ECC TIP W/O NDL

## (undated) DEVICE — SINGLE USE BIOPSY VALVE MAJ-210: Brand: SINGLE USE BIOPSY VALVE (STERILE)

## (undated) DEVICE — 3M™ DURAPORE™ SURGICAL TAPE 1538-3, 3 INCH X 10 YARD (7,5CM X 9,1M), 4 ROLLS/BOX: Brand: 3M™ DURAPORE™

## (undated) DEVICE — REM POLYHESIVE ADULT PATIENT RETURN ELECTRODE: Brand: VALLEYLAB

## (undated) DEVICE — SINGLE USE SUCTION VALVE MAJ-209: Brand: SINGLE USE SUCTION VALVE (STERILE)